# Patient Record
Sex: FEMALE | Race: OTHER | URBAN - METROPOLITAN AREA
[De-identification: names, ages, dates, MRNs, and addresses within clinical notes are randomized per-mention and may not be internally consistent; named-entity substitution may affect disease eponyms.]

---

## 2023-06-16 ENCOUNTER — TELEPHONE (OUTPATIENT)
Dept: OBGYN CLINIC | Facility: CLINIC | Age: 33
End: 2023-06-16

## 2023-06-16 NOTE — TELEPHONE ENCOUNTER
Pt confirms 5/7/23 and +hpt. 28-36 cycles. Pt agrees to PN appt rotation with our 2 male and 4 female physicians. OBN appt scheduled. Pt is taking PNV, advised to check that it has iron, folic acid and dha.

## 2023-06-29 ENCOUNTER — NURSE ONLY (OUTPATIENT)
Dept: OBGYN CLINIC | Facility: CLINIC | Age: 33
End: 2023-06-29

## 2023-06-29 DIAGNOSIS — Z34.01 ENCOUNTER FOR SUPERVISION OF NORMAL FIRST PREGNANCY IN FIRST TRIMESTER: Primary | ICD-10-CM

## 2023-06-29 RX ORDER — MELATONIN
1000 DAILY
COMMUNITY

## 2023-06-29 RX ORDER — CHOLECALCIFEROL (VITAMIN D3) 25 MCG
1 TABLET,CHEWABLE ORAL DAILY
COMMUNITY

## 2023-07-09 ENCOUNTER — LAB ENCOUNTER (OUTPATIENT)
Dept: LAB | Facility: HOSPITAL | Age: 33
End: 2023-07-09
Attending: OBSTETRICS & GYNECOLOGY
Payer: COMMERCIAL

## 2023-07-09 DIAGNOSIS — Z34.01 ENCOUNTER FOR SUPERVISION OF NORMAL FIRST PREGNANCY IN FIRST TRIMESTER: ICD-10-CM

## 2023-07-09 LAB
ANTIBODY SCREEN: NEGATIVE
BASOPHILS # BLD AUTO: 0.03 X10(3) UL (ref 0–0.2)
BASOPHILS NFR BLD AUTO: 0.3 %
DEPRECATED RDW RBC AUTO: 38.2 FL (ref 35.1–46.3)
EOSINOPHIL # BLD AUTO: 0.09 X10(3) UL (ref 0–0.7)
EOSINOPHIL NFR BLD AUTO: 1 %
ERYTHROCYTE [DISTWIDTH] IN BLOOD BY AUTOMATED COUNT: 11.9 % (ref 11–15)
HBV SURFACE AG SER-ACNC: <0.1 [IU]/L
HBV SURFACE AG SERPL QL IA: NONREACTIVE
HCG SERPL QL: POSITIVE
HCT VFR BLD AUTO: 37 %
HCV AB SERPL QL IA: NONREACTIVE
HGB BLD-MCNC: 13.1 G/DL
IMM GRANULOCYTES # BLD AUTO: 0.03 X10(3) UL (ref 0–1)
IMM GRANULOCYTES NFR BLD: 0.3 %
LYMPHOCYTES # BLD AUTO: 2.61 X10(3) UL (ref 1–4)
LYMPHOCYTES NFR BLD AUTO: 29.1 %
MCH RBC QN AUTO: 31.3 PG (ref 26–34)
MCHC RBC AUTO-ENTMCNC: 35.4 G/DL (ref 31–37)
MCV RBC AUTO: 88.5 FL
MONOCYTES # BLD AUTO: 0.46 X10(3) UL (ref 0.1–1)
MONOCYTES NFR BLD AUTO: 5.1 %
NEUTROPHILS # BLD AUTO: 5.76 X10 (3) UL (ref 1.5–7.7)
NEUTROPHILS # BLD AUTO: 5.76 X10(3) UL (ref 1.5–7.7)
NEUTROPHILS NFR BLD AUTO: 64.2 %
PLATELET # BLD AUTO: 310 10(3)UL (ref 150–450)
RBC # BLD AUTO: 4.18 X10(6)UL
RH BLOOD TYPE: POSITIVE
RUBV IGG SER QL: POSITIVE
RUBV IGG SER-ACNC: 50.6 IU/ML (ref 10–?)
WBC # BLD AUTO: 9 X10(3) UL (ref 4–11)

## 2023-07-09 PROCEDURE — 86850 RBC ANTIBODY SCREEN: CPT

## 2023-07-09 PROCEDURE — 36415 COLL VENOUS BLD VENIPUNCTURE: CPT

## 2023-07-09 PROCEDURE — 85025 COMPLETE CBC W/AUTO DIFF WBC: CPT

## 2023-07-09 PROCEDURE — 86803 HEPATITIS C AB TEST: CPT

## 2023-07-09 PROCEDURE — 84703 CHORIONIC GONADOTROPIN ASSAY: CPT

## 2023-07-09 PROCEDURE — 87389 HIV-1 AG W/HIV-1&-2 AB AG IA: CPT

## 2023-07-09 PROCEDURE — 87086 URINE CULTURE/COLONY COUNT: CPT

## 2023-07-09 PROCEDURE — 86762 RUBELLA ANTIBODY: CPT

## 2023-07-09 PROCEDURE — 87340 HEPATITIS B SURFACE AG IA: CPT

## 2023-07-09 PROCEDURE — 86901 BLOOD TYPING SEROLOGIC RH(D): CPT

## 2023-07-09 PROCEDURE — 86780 TREPONEMA PALLIDUM: CPT

## 2023-07-09 PROCEDURE — 86900 BLOOD TYPING SEROLOGIC ABO: CPT

## 2023-07-10 LAB — T PALLIDUM AB SER QL: NEGATIVE

## 2023-07-12 ENCOUNTER — INITIAL PRENATAL (OUTPATIENT)
Dept: OBGYN CLINIC | Facility: CLINIC | Age: 33
End: 2023-07-12

## 2023-07-12 VITALS — WEIGHT: 139.63 LBS | DIASTOLIC BLOOD PRESSURE: 83 MMHG | HEART RATE: 81 BPM | SYSTOLIC BLOOD PRESSURE: 123 MMHG

## 2023-07-12 DIAGNOSIS — Z34.91 ENCOUNTER FOR SUPERVISION OF NORMAL PREGNANCY IN FIRST TRIMESTER, UNSPECIFIED GRAVIDITY: Primary | ICD-10-CM

## 2023-07-12 PROBLEM — R76.0 LUPUS ANTICOAGULANT POSITIVE: Status: ACTIVE | Noted: 2023-07-12

## 2023-07-12 PROBLEM — Z86.018 HISTORY OF UTERINE FIBROID: Status: ACTIVE | Noted: 2023-07-12

## 2023-07-12 LAB
BILIRUBIN: NEGATIVE
GLUCOSE (URINE DIPSTICK): NEGATIVE MG/DL
KETONES (URINE DIPSTICK): NEGATIVE MG/DL
MULTISTIX LOT#: ABNORMAL NUMERIC
NITRITE, URINE: NEGATIVE
OCCULT BLOOD: NEGATIVE
PH, URINE: 8 (ref 4.5–8)
SPECIFIC GRAVITY: 1.01 (ref 1–1.03)
UROBILINOGEN,SEMI-QN: 0.2 MG/DL (ref 0–1.9)

## 2023-07-12 PROCEDURE — 3079F DIAST BP 80-89 MM HG: CPT | Performed by: OBSTETRICS & GYNECOLOGY

## 2023-07-12 PROCEDURE — 3074F SYST BP LT 130 MM HG: CPT | Performed by: OBSTETRICS & GYNECOLOGY

## 2023-07-12 PROCEDURE — 76815 OB US LIMITED FETUS(S): CPT | Performed by: OBSTETRICS & GYNECOLOGY

## 2023-07-12 PROCEDURE — 81002 URINALYSIS NONAUTO W/O SCOPE: CPT | Performed by: OBSTETRICS & GYNECOLOGY

## 2023-07-12 NOTE — PROGRESS NOTES
Has fam h/o SLE- she had workup with pcp and has elevated lupus anticoagulant. Was supposed to see rheumatology but has not - encouraged to see asap and we will need the consult. States was told she has 2 fibroids- she will get report.   Gc/chlam done

## 2023-07-13 LAB
C TRACH DNA SPEC QL NAA+PROBE: NEGATIVE
N GONORRHOEA DNA SPEC QL NAA+PROBE: NEGATIVE

## 2023-07-14 LAB — T VAGINALIS RRNA SPEC QL NAA+PROBE: NEGATIVE

## 2023-08-16 ENCOUNTER — ROUTINE PRENATAL (OUTPATIENT)
Dept: OBGYN CLINIC | Facility: CLINIC | Age: 33
End: 2023-08-16

## 2023-08-16 ENCOUNTER — TELEPHONE (OUTPATIENT)
Dept: RHEUMATOLOGY | Facility: CLINIC | Age: 33
End: 2023-08-16

## 2023-08-16 ENCOUNTER — TELEPHONE (OUTPATIENT)
Dept: OBGYN CLINIC | Facility: CLINIC | Age: 33
End: 2023-08-16

## 2023-08-16 VITALS
HEART RATE: 89 BPM | HEIGHT: 60 IN | WEIGHT: 137 LBS | SYSTOLIC BLOOD PRESSURE: 111 MMHG | DIASTOLIC BLOOD PRESSURE: 78 MMHG | BODY MASS INDEX: 26.9 KG/M2

## 2023-08-16 DIAGNOSIS — Z34.92 ENCOUNTER FOR SUPERVISION OF NORMAL PREGNANCY IN SECOND TRIMESTER, UNSPECIFIED GRAVIDITY: Primary | ICD-10-CM

## 2023-08-16 DIAGNOSIS — R76.0 LUPUS ANTICOAGULANT POSITIVE: Primary | ICD-10-CM

## 2023-08-16 LAB
BILIRUBIN: NEGATIVE
GLUCOSE (URINE DIPSTICK): NEGATIVE MG/DL
KETONES (URINE DIPSTICK): NEGATIVE MG/DL
LEUKOCYTES: NEGATIVE
MULTISTIX LOT#: 57 NUMERIC
NITRITE, URINE: NEGATIVE
OCCULT BLOOD: NEGATIVE
PH, URINE: 7.5 (ref 4.5–8)
PROTEIN (URINE DIPSTICK): NEGATIVE MG/DL
SPECIFIC GRAVITY: 1 (ref 1–1.03)
UROBILINOGEN,SEMI-QN: 0.2 MG/DL (ref 0–1.9)

## 2023-08-16 PROCEDURE — 3008F BODY MASS INDEX DOCD: CPT | Performed by: OBSTETRICS & GYNECOLOGY

## 2023-08-16 PROCEDURE — 3078F DIAST BP <80 MM HG: CPT | Performed by: OBSTETRICS & GYNECOLOGY

## 2023-08-16 PROCEDURE — 3074F SYST BP LT 130 MM HG: CPT | Performed by: OBSTETRICS & GYNECOLOGY

## 2023-08-16 PROCEDURE — 81002 URINALYSIS NONAUTO W/O SCOPE: CPT | Performed by: OBSTETRICS & GYNECOLOGY

## 2023-08-16 RX ORDER — METOCLOPRAMIDE 10 MG/1
10 TABLET ORAL EVERY 6 HOURS PRN
Qty: 20 TABLET | Refills: 0 | Status: SHIPPED | OUTPATIENT
Start: 2023-08-16

## 2023-08-16 NOTE — PROGRESS NOTES
Here with partner. [facility] ultrasound-- will change EDC. Made rheumatology appt in 10/2023.   RTC 4 wk

## 2023-08-16 NOTE — TELEPHONE ENCOUNTER
Call placed to Dr. Maryelizabeth Hatchet office  at [phone] to request sooner visit. PSR put in a message for Dr. Soledad Steward and staff to see if able to add sooner. Await call back. Pt notified of change in [facility]. Patient verbalized understanding.

## 2023-08-16 NOTE — TELEPHONE ENCOUNTER
Ivan Johnson's office calling to inform that patient has tested positive for lupus antibodies, and also has a family history of SLE and is pregnant.  Dr. Radha Anthony is asking if patient can be seen sooner than the October appointment, please advise

## 2023-08-16 NOTE — TELEPHONE ENCOUNTER
Pt has rheumatology appt with Dr Yas Price 10/2023. Pt with +lupus anticoagulant.   Please assist pt by calling office to move up her appt --she needs to be seen as soon as possible    Also please inform pt that Crescencio Albarado changed to 2/18/24--after reviewing her ultrasound done at [facility]

## 2023-08-16 NOTE — TELEPHONE ENCOUNTER
To HALINA: please advise if oyu want me to assist with this or would you like the Rns to assist with this?

## 2023-08-17 ENCOUNTER — TELEPHONE (OUTPATIENT)
Dept: PERINATAL CARE | Facility: HOSPITAL | Age: 33
End: 2023-08-17

## 2023-08-17 NOTE — TELEPHONE ENCOUNTER
Returned pt call RE scheduling.   No answer  Left Voice mail to call back with [facility] number  [phone]

## 2023-08-21 NOTE — TELEPHONE ENCOUNTER
Phoned patient asking if she can come in today at 2pm, patient is unable because she is working until 3:30.  Patient will remain on wait list.

## 2023-08-24 NOTE — TELEPHONE ENCOUNTER
Discussed with Toya (Rhe[facility]). Pt was offered sooner appt with Dr. Evaristo Marshall and pt declined due to work schedule. Pt scheduled for sooner appt 9/28/23 with Dr. Evaristo Marshall.

## 2023-09-14 ENCOUNTER — ROUTINE PRENATAL (OUTPATIENT)
Dept: OBGYN CLINIC | Facility: CLINIC | Age: 33
End: 2023-09-14

## 2023-09-14 VITALS
BODY MASS INDEX: 27 KG/M2 | HEART RATE: 81 BPM | WEIGHT: 136 LBS | SYSTOLIC BLOOD PRESSURE: 119 MMHG | DIASTOLIC BLOOD PRESSURE: 82 MMHG

## 2023-09-14 DIAGNOSIS — Z34.92 ENCOUNTER FOR SUPERVISION OF NORMAL PREGNANCY IN SECOND TRIMESTER, UNSPECIFIED GRAVIDITY: Primary | ICD-10-CM

## 2023-09-14 LAB
BILIRUBIN: NEGATIVE
GLUCOSE (URINE DIPSTICK): NEGATIVE MG/DL
KETONES (URINE DIPSTICK): NEGATIVE MG/DL
LEUKOCYTES: NEGATIVE
MULTISTIX LOT#: 57 NUMERIC
NITRITE, URINE: NEGATIVE
OCCULT BLOOD: NEGATIVE
PH, URINE: 7.5 (ref 4.5–8)
PROTEIN (URINE DIPSTICK): NEGATIVE MG/DL
SPECIFIC GRAVITY: 1.01 (ref 1–1.03)
UROBILINOGEN,SEMI-QN: 0.2 MG/DL (ref 0–1.9)

## 2023-09-14 PROCEDURE — 81002 URINALYSIS NONAUTO W/O SCOPE: CPT | Performed by: OBSTETRICS & GYNECOLOGY

## 2023-09-14 PROCEDURE — 3074F SYST BP LT 130 MM HG: CPT | Performed by: OBSTETRICS & GYNECOLOGY

## 2023-09-14 PROCEDURE — 3079F DIAST BP 80-89 MM HG: CPT | Performed by: OBSTETRICS & GYNECOLOGY

## 2023-09-28 ENCOUNTER — LAB ENCOUNTER (OUTPATIENT)
Dept: LAB | Age: 33
End: 2023-09-28
Attending: INTERNAL MEDICINE
Payer: COMMERCIAL

## 2023-09-28 ENCOUNTER — OFFICE VISIT (OUTPATIENT)
Dept: RHEUMATOLOGY | Facility: CLINIC | Age: 33
End: 2023-09-28

## 2023-09-28 VITALS
WEIGHT: 139 LBS | BODY MASS INDEX: 27.29 KG/M2 | DIASTOLIC BLOOD PRESSURE: 74 MMHG | HEIGHT: 60 IN | HEART RATE: 88 BPM | SYSTOLIC BLOOD PRESSURE: 122 MMHG

## 2023-09-28 DIAGNOSIS — M25.50 POLYARTHRALGIA: ICD-10-CM

## 2023-09-28 DIAGNOSIS — M25.50 POLYARTHRALGIA: Primary | ICD-10-CM

## 2023-09-28 LAB — RHEUMATOID FACT SERPL-ACNC: <10 IU/ML (ref ?–15)

## 2023-09-28 PROCEDURE — 3074F SYST BP LT 130 MM HG: CPT | Performed by: INTERNAL MEDICINE

## 2023-09-28 PROCEDURE — 85610 PROTHROMBIN TIME: CPT

## 2023-09-28 PROCEDURE — 86147 CARDIOLIPIN ANTIBODY EA IG: CPT

## 2023-09-28 PROCEDURE — 85613 RUSSELL VIPER VENOM DILUTED: CPT

## 2023-09-28 PROCEDURE — 3078F DIAST BP <80 MM HG: CPT | Performed by: INTERNAL MEDICINE

## 2023-09-28 PROCEDURE — 36415 COLL VENOUS BLD VENIPUNCTURE: CPT

## 2023-09-28 PROCEDURE — 85598 HEXAGNAL PHOSPH PLTLT NEUTRL: CPT

## 2023-09-28 PROCEDURE — 86431 RHEUMATOID FACTOR QUANT: CPT | Performed by: INTERNAL MEDICINE

## 2023-09-28 PROCEDURE — 3008F BODY MASS INDEX DOCD: CPT | Performed by: INTERNAL MEDICINE

## 2023-09-28 PROCEDURE — 99204 OFFICE O/P NEW MOD 45 MIN: CPT | Performed by: INTERNAL MEDICINE

## 2023-09-28 PROCEDURE — 85730 THROMBOPLASTIN TIME PARTIAL: CPT

## 2023-09-28 PROCEDURE — 86146 BETA-2 GLYCOPROTEIN ANTIBODY: CPT | Performed by: INTERNAL MEDICINE

## 2023-09-28 PROCEDURE — 81374 HLA I TYPING 1 ANTIGEN LR: CPT

## 2023-09-28 PROCEDURE — 85390 FIBRINOLYSINS SCREEN I&R: CPT

## 2023-09-29 LAB
APTT PPP: 35.4 SECONDS (ref 23.3–35.6)
B2 GLYCOPROT1 IGG SERPL IA-ACNC: <0.8 U/ML (ref ?–7)
B2 GLYCOPROT1 IGM SERPL IA-ACNC: <2.4 U/ML (ref ?–7)
CARDIOLIPIN IGG SERPL-ACNC: 1.2 GPL (ref ?–10)
CARDIOLIPIN IGM SERPL-ACNC: 2.5 MPL (ref ?–10)
CONFIRM APTT STACLOT: NEGATIVE
CONFIRM DRVVT: 1 S (ref 0–1.1)
PROTHROMBIN TIME: 13.2 SECONDS (ref 11.6–14.8)

## 2023-10-04 ENCOUNTER — HOSPITAL ENCOUNTER (OUTPATIENT)
Dept: PERINATAL CARE | Facility: HOSPITAL | Age: 33
Discharge: HOME | End: 2023-10-04
Attending: OBSTETRICS & GYNECOLOGY

## 2023-10-04 ENCOUNTER — HOSPITAL ENCOUNTER (OUTPATIENT)
Dept: PERINATAL CARE | Facility: HOSPITAL | Age: 33
Discharge: HOME | End: 2023-10-04
Attending: OBSTETRICS & GYNECOLOGY
Payer: COMMERCIAL

## 2023-10-04 ENCOUNTER — HOSPITAL ENCOUNTER (EMERGENCY)
Facility: HOSPITAL | Age: 33
Discharge: STILL ADMITTED OR ADMINISTRATIVE | End: 2023-10-04
Payer: COMMERCIAL

## 2023-10-04 ENCOUNTER — HOSPITAL ENCOUNTER (OUTPATIENT)
Facility: HOSPITAL | Age: 33
Discharge: ANOTHER HOSPITAL | End: 2023-10-05
Attending: OBSTETRICS & GYNECOLOGY | Admitting: OBSTETRICS & GYNECOLOGY

## 2023-10-04 VITALS — HEART RATE: 100 BPM | SYSTOLIC BLOOD PRESSURE: 139 MMHG | DIASTOLIC BLOOD PRESSURE: 83 MMHG

## 2023-10-04 VITALS
WEIGHT: 139 LBS | SYSTOLIC BLOOD PRESSURE: 123 MMHG | BODY MASS INDEX: 27 KG/M2 | HEART RATE: 103 BPM | DIASTOLIC BLOOD PRESSURE: 73 MMHG

## 2023-10-04 DIAGNOSIS — R76.0 LUPUS ANTICOAGULANT POSITIVE: ICD-10-CM

## 2023-10-04 DIAGNOSIS — Z86.018 HISTORY OF UTERINE FIBROID: ICD-10-CM

## 2023-10-04 DIAGNOSIS — Z3A.20 PREGNANCY WITH 20 COMPLETED WEEKS GESTATION: ICD-10-CM

## 2023-10-04 DIAGNOSIS — R76.0 LUPUS ANTICOAGULANT POSITIVE: Primary | ICD-10-CM

## 2023-10-04 PROCEDURE — 76811 OB US DETAILED SNGL FETUS: CPT | Performed by: OBSTETRICS & GYNECOLOGY

## 2023-10-05 ENCOUNTER — HOSPITAL ENCOUNTER (EMERGENCY)
Facility: HOSPITAL | Age: 33
Discharge: HOME | End: 2023-10-05
Attending: EMERGENCY MEDICINE

## 2023-10-05 VITALS
SYSTOLIC BLOOD PRESSURE: 113 MMHG | TEMPERATURE: 99 F | DIASTOLIC BLOOD PRESSURE: 70 MMHG | HEART RATE: 80 BPM | RESPIRATION RATE: 17 BRPM | OXYGEN SATURATION: 99 %

## 2023-10-05 DIAGNOSIS — S16.1XXA STRAIN OF NECK MUSCLE, INITIAL ENCOUNTER: ICD-10-CM

## 2023-10-05 DIAGNOSIS — S09.90XA INJURY OF HEAD, INITIAL ENCOUNTER: Primary | ICD-10-CM

## 2023-10-05 PROCEDURE — 99283 EMERGENCY DEPT VISIT LOW MDM: CPT

## 2023-10-05 PROCEDURE — 59025 FETAL NON-STRESS TEST: CPT | Performed by: OBSTETRICS & GYNECOLOGY

## 2023-10-05 PROCEDURE — 99282 EMERGENCY DEPT VISIT SF MDM: CPT
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
## 2023-10-05 NOTE — ED INITIAL ASSESSMENT (HPI)
Fall today after her dog took her out from behind. C/o pain in left posterior head, neck and back. No thinners. She is 20 weeks pregnant and went to [facility] already.

## 2023-10-05 NOTE — DISCHARGE INSTRUCTIONS
Take Tylenol as needed for pain. Apply ice to the injured areas for 20 minutes at a time over the next 2 days. Follow-up with your primary physician and your OB as discussed. Return to the emergency department if severe headache, repeated vomiting, vision changes, focal weakness, discoordination, or other new symptoms develop.

## 2023-10-05 NOTE — ED QUICK NOTES
Discharge instructions including follow-up care and signs and symptoms to return to ED were reviewed and discussed with patient. Pt verbalized understanding to all information and all questions asked were answered at this time. Pt is AAOx4, calm, respirations noted as even and unlabored, skin warm and dry, and there are no signs or symptoms of distress noted at this time. Pt ambulatory with a steady gait to exit with family.

## 2023-10-05 NOTE — ED QUICK NOTES
Pt to ed w/c of fall at around 2200 yesterday. The pt states she was walking her dog when it took her down from behind. The pt states she fell head first on grass. Pt is 20 weeks pregnant. Reports headache, neck and tailbone pain. Pt in no obvious distress.

## 2023-10-09 LAB — HLA-B27: NEGATIVE

## 2023-10-12 ENCOUNTER — ROUTINE PRENATAL (OUTPATIENT)
Dept: OBGYN CLINIC | Facility: CLINIC | Age: 33
End: 2023-10-12

## 2023-10-12 VITALS
WEIGHT: 140.19 LBS | SYSTOLIC BLOOD PRESSURE: 112 MMHG | DIASTOLIC BLOOD PRESSURE: 72 MMHG | HEART RATE: 84 BPM | BODY MASS INDEX: 27 KG/M2

## 2023-10-12 DIAGNOSIS — Z34.92 ENCOUNTER FOR SUPERVISION OF NORMAL PREGNANCY IN SECOND TRIMESTER, UNSPECIFIED GRAVIDITY: Primary | ICD-10-CM

## 2023-10-12 LAB
APPEARANCE: CLEAR
BILIRUBIN: NEGATIVE
GLUCOSE (URINE DIPSTICK): NEGATIVE MG/DL
KETONES (URINE DIPSTICK): NEGATIVE MG/DL
LEUKOCYTES: NEGATIVE
MULTISTIX LOT#: NORMAL NUMERIC
NITRITE, URINE: NEGATIVE
OCCULT BLOOD: NEGATIVE
PH, URINE: 7.5 (ref 4.5–8)
SPECIFIC GRAVITY: 1 (ref 1–1.03)
URINE-COLOR: YELLOW
UROBILINOGEN,SEMI-QN: 0.2 MG/DL (ref 0–1.9)

## 2023-10-12 PROCEDURE — 3078F DIAST BP <80 MM HG: CPT | Performed by: OBSTETRICS & GYNECOLOGY

## 2023-10-12 PROCEDURE — 3074F SYST BP LT 130 MM HG: CPT | Performed by: OBSTETRICS & GYNECOLOGY

## 2023-10-12 PROCEDURE — 81002 URINALYSIS NONAUTO W/O SCOPE: CPT | Performed by: OBSTETRICS & GYNECOLOGY

## 2023-10-18 ENCOUNTER — TELEPHONE (OUTPATIENT)
Dept: OBGYN CLINIC | Facility: CLINIC | Age: 33
End: 2023-10-18

## 2023-10-18 NOTE — TELEPHONE ENCOUNTER
Order received from [facility] for breast pump. Form completed and faxed with confirmation. Sent to scanning.

## 2023-10-27 ENCOUNTER — PATIENT MESSAGE (OUTPATIENT)
Dept: OBGYN CLINIC | Facility: CLINIC | Age: 33
End: 2023-10-27

## 2023-10-27 NOTE — TELEPHONE ENCOUNTER
From: Baudilio Lincoln  To: Estevan Mai  Sent: 10/27/2023 1:11 PM CDT  Subject: Cramping/pressure    Hello, I am 23 weeks pregnant and since last night I started feeling light cramping/pressure underneath my stomach in my pelvic area. I just wanted to know if that is normal or is that something to be concerned about?

## 2023-10-27 NOTE — TELEPHONE ENCOUNTER
From: Jaime Goodman  To: Addison Aly  Sent: 10/27/2023 11:25 AM CDT  Subject: Cramping/pressure    Hello, I am 23 weeks pregnant and since last night I started feeling light cramping/pressure underneath my stomach in my pelvic area. I just wanted to know if that is normal or is that something to be concerned about?

## 2023-10-29 ENCOUNTER — LAB ENCOUNTER (OUTPATIENT)
Dept: LAB | Facility: HOSPITAL | Age: 33
End: 2023-10-29
Attending: OBSTETRICS & GYNECOLOGY
Payer: COMMERCIAL

## 2023-10-29 DIAGNOSIS — Z34.92 ENCOUNTER FOR SUPERVISION OF NORMAL PREGNANCY IN SECOND TRIMESTER, UNSPECIFIED GRAVIDITY: ICD-10-CM

## 2023-10-29 LAB
BILIRUB UR QL: NEGATIVE
CLARITY UR: CLEAR
COLOR UR: YELLOW
GLUCOSE UR-MCNC: NORMAL MG/DL
HGB UR QL STRIP.AUTO: NEGATIVE
KETONES UR-MCNC: NEGATIVE MG/DL
LEUKOCYTE ESTERASE UR QL STRIP.AUTO: NEGATIVE
NITRITE UR QL STRIP.AUTO: NEGATIVE
PH UR: 6.5 [PH] (ref 5–8)
PROT UR-MCNC: 30 MG/DL
SP GR UR STRIP: 1.02 (ref 1–1.03)
UROBILINOGEN UR STRIP-ACNC: NORMAL

## 2023-10-29 PROCEDURE — 81001 URINALYSIS AUTO W/SCOPE: CPT

## 2023-11-09 ENCOUNTER — ROUTINE PRENATAL (OUTPATIENT)
Dept: OBGYN CLINIC | Facility: CLINIC | Age: 33
End: 2023-11-09
Payer: COMMERCIAL

## 2023-11-09 VITALS
BODY MASS INDEX: 27 KG/M2 | WEIGHT: 140 LBS | HEART RATE: 83 BPM | DIASTOLIC BLOOD PRESSURE: 72 MMHG | SYSTOLIC BLOOD PRESSURE: 112 MMHG

## 2023-11-09 DIAGNOSIS — Z34.92 ENCOUNTER FOR SUPERVISION OF NORMAL PREGNANCY IN SECOND TRIMESTER, UNSPECIFIED GRAVIDITY: Primary | ICD-10-CM

## 2023-11-09 LAB
BILIRUBIN: NEGATIVE
GLUCOSE (URINE DIPSTICK): NEGATIVE MG/DL
KETONES (URINE DIPSTICK): NEGATIVE MG/DL
LEUKOCYTES: NEGATIVE
MULTISTIX LOT#: 57 NUMERIC
NITRITE, URINE: NEGATIVE
OCCULT BLOOD: NEGATIVE
PH, URINE: 7.5 (ref 4.5–8)
SPECIFIC GRAVITY: 1.01 (ref 1–1.03)
UROBILINOGEN,SEMI-QN: 0.2 MG/DL (ref 0–1.9)

## 2023-11-09 PROCEDURE — 3078F DIAST BP <80 MM HG: CPT | Performed by: OBSTETRICS & GYNECOLOGY

## 2023-11-09 PROCEDURE — 3074F SYST BP LT 130 MM HG: CPT | Performed by: OBSTETRICS & GYNECOLOGY

## 2023-11-09 PROCEDURE — 81002 URINALYSIS NONAUTO W/O SCOPE: CPT | Performed by: OBSTETRICS & GYNECOLOGY

## 2023-11-10 ENCOUNTER — PATIENT MESSAGE (OUTPATIENT)
Dept: RHEUMATOLOGY | Facility: CLINIC | Age: 33
End: 2023-11-10

## 2023-11-10 DIAGNOSIS — M25.50 POLYARTHRALGIA: Primary | ICD-10-CM

## 2023-11-30 ENCOUNTER — ROUTINE PRENATAL (OUTPATIENT)
Dept: OBGYN CLINIC | Facility: CLINIC | Age: 33
End: 2023-11-30
Payer: MEDICAID

## 2023-11-30 VITALS — BODY MASS INDEX: 28 KG/M2 | DIASTOLIC BLOOD PRESSURE: 77 MMHG | SYSTOLIC BLOOD PRESSURE: 117 MMHG | WEIGHT: 142 LBS

## 2023-11-30 DIAGNOSIS — Z34.83 ENCOUNTER FOR SUPERVISION OF OTHER NORMAL PREGNANCY IN THIRD TRIMESTER: Primary | ICD-10-CM

## 2023-11-30 LAB
APPEARANCE: CLEAR
BILIRUBIN: NEGATIVE
GLUCOSE (URINE DIPSTICK): NEGATIVE MG/DL
KETONES (URINE DIPSTICK): NEGATIVE MG/DL
MULTISTIX LOT#: ABNORMAL NUMERIC
NITRITE, URINE: NEGATIVE
OCCULT BLOOD: NEGATIVE
PH, URINE: 7 (ref 4.5–8)
PROTEIN (URINE DIPSTICK): NEGATIVE MG/DL
SPECIFIC GRAVITY: 1.01 (ref 1–1.03)
URINE-COLOR: YELLOW
UROBILINOGEN,SEMI-QN: 0.2 MG/DL (ref 0–1.9)

## 2023-11-30 PROCEDURE — 3078F DIAST BP <80 MM HG: CPT | Performed by: OBSTETRICS & GYNECOLOGY

## 2023-11-30 PROCEDURE — 81002 URINALYSIS NONAUTO W/O SCOPE: CPT | Performed by: OBSTETRICS & GYNECOLOGY

## 2023-11-30 PROCEDURE — 99213 OFFICE O/P EST LOW 20 MIN: CPT | Performed by: OBSTETRICS & GYNECOLOGY

## 2023-11-30 PROCEDURE — 3074F SYST BP LT 130 MM HG: CPT | Performed by: OBSTETRICS & GYNECOLOGY

## 2023-12-01 NOTE — PROGRESS NOTES
Lesly Marroquin at visit. No S/S of PTL. Discussed TDAP for her and family. She wishes to do next visit.

## 2023-12-07 ENCOUNTER — LAB ENCOUNTER (OUTPATIENT)
Dept: LAB | Facility: HOSPITAL | Age: 33
End: 2023-12-07
Attending: OBSTETRICS & GYNECOLOGY
Payer: COMMERCIAL

## 2023-12-07 LAB
DEPRECATED RDW RBC AUTO: 41.3 FL (ref 35.1–46.3)
ERYTHROCYTE [DISTWIDTH] IN BLOOD BY AUTOMATED COUNT: 12.7 % (ref 11–15)
GLUCOSE 1H P GLC SERPL-MCNC: 108 MG/DL
HCT VFR BLD AUTO: 31.2 %
HGB BLD-MCNC: 10.8 G/DL
MCH RBC QN AUTO: 31 PG (ref 26–34)
MCHC RBC AUTO-ENTMCNC: 34.6 G/DL (ref 31–37)
MCV RBC AUTO: 89.7 FL
PLATELET # BLD AUTO: 277 10(3)UL (ref 150–450)
RBC # BLD AUTO: 3.48 X10(6)UL
WBC # BLD AUTO: 7.8 X10(3) UL (ref 4–11)

## 2023-12-07 PROCEDURE — 85027 COMPLETE CBC AUTOMATED: CPT | Performed by: OBSTETRICS & GYNECOLOGY

## 2023-12-07 PROCEDURE — 36415 COLL VENOUS BLD VENIPUNCTURE: CPT | Performed by: OBSTETRICS & GYNECOLOGY

## 2023-12-07 PROCEDURE — 82950 GLUCOSE TEST: CPT | Performed by: OBSTETRICS & GYNECOLOGY

## 2023-12-12 PROBLEM — O99.019 ANEMIA AFFECTING PREGNANCY (HCC): Status: ACTIVE | Noted: 2023-12-12

## 2023-12-12 PROBLEM — O99.019 ANEMIA AFFECTING PREGNANCY: Status: ACTIVE | Noted: 2023-12-12

## 2023-12-14 ENCOUNTER — ROUTINE PRENATAL (OUTPATIENT)
Dept: OBGYN CLINIC | Facility: CLINIC | Age: 33
End: 2023-12-14
Payer: COMMERCIAL

## 2023-12-14 ENCOUNTER — LAB ENCOUNTER (OUTPATIENT)
Dept: LAB | Facility: HOSPITAL | Age: 33
End: 2023-12-14
Attending: OBSTETRICS & GYNECOLOGY
Payer: COMMERCIAL

## 2023-12-14 ENCOUNTER — TELEPHONE (OUTPATIENT)
Dept: OBGYN CLINIC | Facility: CLINIC | Age: 33
End: 2023-12-14

## 2023-12-14 VITALS
DIASTOLIC BLOOD PRESSURE: 69 MMHG | SYSTOLIC BLOOD PRESSURE: 111 MMHG | BODY MASS INDEX: 28 KG/M2 | WEIGHT: 145.19 LBS | HEART RATE: 88 BPM

## 2023-12-14 DIAGNOSIS — Z34.93 ENCOUNTER FOR SUPERVISION OF NORMAL PREGNANCY IN THIRD TRIMESTER, UNSPECIFIED GRAVIDITY: Primary | ICD-10-CM

## 2023-12-14 DIAGNOSIS — L29.9 ITCHING: ICD-10-CM

## 2023-12-14 LAB
ALBUMIN SERPL-MCNC: 4 G/DL (ref 3.2–4.8)
ALBUMIN/GLOB SERPL: 1.3 {RATIO} (ref 1–2)
ALP LIVER SERPL-CCNC: 111 U/L
ALT SERPL-CCNC: 9 U/L
ANION GAP SERPL CALC-SCNC: 7 MMOL/L (ref 0–18)
APPEARANCE: CLEAR
AST SERPL-CCNC: 18 U/L (ref ?–34)
BILIRUB SERPL-MCNC: 0.2 MG/DL (ref 0.3–1.2)
BILIRUBIN: NEGATIVE
BUN BLD-MCNC: <5 MG/DL (ref 9–23)
CALCIUM BLD-MCNC: 9.2 MG/DL (ref 8.7–10.4)
CHLORIDE SERPL-SCNC: 106 MMOL/L (ref 98–112)
CO2 SERPL-SCNC: 24 MMOL/L (ref 21–32)
CREAT BLD-MCNC: 0.47 MG/DL
EGFRCR SERPLBLD CKD-EPI 2021: 130 ML/MIN/1.73M2 (ref 60–?)
FASTING STATUS PATIENT QL REPORTED: YES
GLOBULIN PLAS-MCNC: 3 G/DL (ref 2.8–4.4)
GLUCOSE (URINE DIPSTICK): NEGATIVE MG/DL
GLUCOSE BLD-MCNC: 83 MG/DL (ref 70–99)
KETONES (URINE DIPSTICK): NEGATIVE MG/DL
LEUKOCYTES: NEGATIVE
MULTISTIX LOT#: NORMAL NUMERIC
NITRITE, URINE: NEGATIVE
OCCULT BLOOD: NEGATIVE
PH, URINE: 7.5 (ref 4.5–8)
POTASSIUM SERPL-SCNC: 3.7 MMOL/L (ref 3.5–5.1)
PROT SERPL-MCNC: 7 G/DL (ref 5.7–8.2)
PROTEIN (URINE DIPSTICK): NEGATIVE MG/DL
SODIUM SERPL-SCNC: 137 MMOL/L (ref 136–145)
SPECIFIC GRAVITY: 1.01 (ref 1–1.03)
URINE-COLOR: YELLOW
UROBILINOGEN,SEMI-QN: 0.2 MG/DL (ref 0–1.9)

## 2023-12-14 PROCEDURE — 81002 URINALYSIS NONAUTO W/O SCOPE: CPT | Performed by: OBSTETRICS & GYNECOLOGY

## 2023-12-14 PROCEDURE — 3078F DIAST BP <80 MM HG: CPT | Performed by: OBSTETRICS & GYNECOLOGY

## 2023-12-14 PROCEDURE — 3074F SYST BP LT 130 MM HG: CPT | Performed by: OBSTETRICS & GYNECOLOGY

## 2023-12-14 PROCEDURE — 82239 BILE ACIDS TOTAL: CPT

## 2023-12-14 PROCEDURE — 80053 COMPREHEN METABOLIC PANEL: CPT

## 2023-12-14 PROCEDURE — 99213 OFFICE O/P EST LOW 20 MIN: CPT | Performed by: OBSTETRICS & GYNECOLOGY

## 2023-12-14 PROCEDURE — 36415 COLL VENOUS BLD VENIPUNCTURE: CPT

## 2023-12-14 PROCEDURE — 90471 IMMUNIZATION ADMIN: CPT | Performed by: OBSTETRICS & GYNECOLOGY

## 2023-12-14 PROCEDURE — 90715 TDAP VACCINE 7 YRS/> IM: CPT | Performed by: OBSTETRICS & GYNECOLOGY

## 2023-12-14 NOTE — TELEPHONE ENCOUNTER
Received FMLA forms from patient for spouse's paternity leave. LAI was signed and NO payment was collected. Sent to the forms department for completion.

## 2023-12-14 NOTE — PROGRESS NOTES
Has starting having itching of the hands and feet for the past 2 nights; bile acids ordered. Will start daily iron. Tdap today. Has 32 wk growth scheduled. Undecided on flu vaccine.  RTC 2 wks

## 2023-12-16 LAB — BILE ACIDS: <1 UMOL/L

## 2023-12-19 ENCOUNTER — PATIENT MESSAGE (OUTPATIENT)
Dept: OBGYN CLINIC | Facility: CLINIC | Age: 33
End: 2023-12-19

## 2023-12-20 NOTE — TELEPHONE ENCOUNTER
From: Sajan Valle  To: Adama Ellis  Sent: 12/19/2023 10:25 PM CST  Subject: Bile acids/comp metabolic panel results     Hello, I wanted to see if you were able to review my test results that were done on the 14th? Thank you in advance.

## 2023-12-28 ENCOUNTER — HOSPITAL ENCOUNTER (OUTPATIENT)
Dept: PERINATAL CARE | Facility: HOSPITAL | Age: 33
Discharge: HOME | End: 2023-12-28
Attending: OBSTETRICS & GYNECOLOGY
Payer: COMMERCIAL

## 2023-12-28 ENCOUNTER — ROUTINE PRENATAL (OUTPATIENT)
Dept: OBGYN CLINIC | Facility: CLINIC | Age: 33
End: 2023-12-28

## 2023-12-28 VITALS
HEART RATE: 84 BPM | BODY MASS INDEX: 28 KG/M2 | WEIGHT: 145 LBS | DIASTOLIC BLOOD PRESSURE: 74 MMHG | SYSTOLIC BLOOD PRESSURE: 111 MMHG

## 2023-12-28 VITALS
WEIGHT: 144.63 LBS | HEART RATE: 86 BPM | BODY MASS INDEX: 28 KG/M2 | SYSTOLIC BLOOD PRESSURE: 124 MMHG | DIASTOLIC BLOOD PRESSURE: 86 MMHG

## 2023-12-28 DIAGNOSIS — O99.013 ANEMIA AFFECTING PREGNANCY IN THIRD TRIMESTER: ICD-10-CM

## 2023-12-28 DIAGNOSIS — Z34.93 ENCOUNTER FOR SUPERVISION OF NORMAL PREGNANCY IN THIRD TRIMESTER, UNSPECIFIED GRAVIDITY: Primary | ICD-10-CM

## 2023-12-28 DIAGNOSIS — R76.0 LUPUS ANTICOAGULANT POSITIVE: ICD-10-CM

## 2023-12-28 DIAGNOSIS — R76.0 LUPUS ANTICOAGULANT POSITIVE: Primary | ICD-10-CM

## 2023-12-28 LAB
APPEARANCE: CLEAR
BILIRUBIN: NEGATIVE
GLUCOSE (URINE DIPSTICK): NEGATIVE MG/DL
KETONES (URINE DIPSTICK): NEGATIVE MG/DL
LEUKOCYTES: NEGATIVE
MULTISTIX LOT#: NORMAL NUMERIC
NITRITE, URINE: NEGATIVE
OCCULT BLOOD: NEGATIVE
PH, URINE: 7 (ref 4.5–8)
PROTEIN (URINE DIPSTICK): NEGATIVE MG/DL
SPECIFIC GRAVITY: 1.01 (ref 1–1.03)
URINE-COLOR: YELLOW
UROBILINOGEN,SEMI-QN: 0.2 MG/DL (ref 0–1.9)

## 2023-12-28 PROCEDURE — 76819 FETAL BIOPHYS PROFIL W/O NST: CPT

## 2023-12-28 PROCEDURE — 3074F SYST BP LT 130 MM HG: CPT | Performed by: OBSTETRICS & GYNECOLOGY

## 2023-12-28 PROCEDURE — 3079F DIAST BP 80-89 MM HG: CPT | Performed by: OBSTETRICS & GYNECOLOGY

## 2023-12-28 PROCEDURE — 76816 OB US FOLLOW-UP PER FETUS: CPT | Performed by: OBSTETRICS & GYNECOLOGY

## 2023-12-28 PROCEDURE — 81002 URINALYSIS NONAUTO W/O SCOPE: CPT | Performed by: OBSTETRICS & GYNECOLOGY

## 2023-12-28 RX ORDER — ASPIRIN 81 MG/1
81 TABLET ORAL DAILY
COMMUNITY

## 2023-12-28 NOTE — PROGRESS NOTES
Here with partner. Discussed RSV vacc. Has MFM ultrasound today. No more itching on hands.   Bile salts normal.  RTC 2 wk

## 2024-01-09 ENCOUNTER — TELEPHONE (OUTPATIENT)
Dept: OBGYN CLINIC | Facility: CLINIC | Age: 34
End: 2024-01-09

## 2024-01-09 NOTE — TELEPHONE ENCOUNTER
Patient's  called on status of FMLA forms.  I told him that we are waiting for the doctor's signature. Pt would like to pickup forms at the providers office once the forms are completed. Patient expressed understanding.

## 2024-01-09 NOTE — TELEPHONE ENCOUNTER
Dr. Medina,     *The ACKNOWLEDGE button has been moved to the top right ribbon*    Please sign off on form if you agree to: Spouse FMLA   (place your signature on the first page only)    -From your Inbasket, Highlight the patient and click Chart   -Double click the 12/14/2023 Forms Completion telephone encounter  -Scroll down to the Media section   -Click the blue Hyperlink: MISSAEL SP Dr. Smitha Medina 01/09/2024  -Click Acknowledge located in the top right ribbon/menu   -Drag the mouse into the blank space of the document and a + sign will appear. Left click to   electronically sign the document.     Thank you,  Rosalva NOGUEIRA

## 2024-01-09 NOTE — TELEPHONE ENCOUNTER
Patient's  called on status of FMLA forms.  I told him that we are waiting for the doctor's signature. Pt would like to pickup forms at the providers office once the forms are completed. Patient expressed understanding

## 2024-01-15 ENCOUNTER — HOSPITAL ENCOUNTER (OUTPATIENT)
Dept: ULTRASOUND IMAGING | Facility: HOSPITAL | Age: 34
Discharge: HOME | End: 2024-01-15
Attending: INTERNAL MEDICINE
Payer: COMMERCIAL

## 2024-01-15 DIAGNOSIS — M25.50 POLYARTHRALGIA: ICD-10-CM

## 2024-01-15 PROCEDURE — 76881 US COMPL JOINT R-T W/IMG: CPT | Performed by: INTERNAL MEDICINE

## 2024-01-15 NOTE — TELEPHONE ENCOUNTER
Cld pt adv that forms were completed, pt adv that she has appt w/ MD tomorrow and will , gave instructions as far as letting the nurse know forms are located in Media Section of chart!    Archived forms

## 2024-01-16 ENCOUNTER — LAB ENCOUNTER (OUTPATIENT)
Dept: LAB | Facility: HOSPITAL | Age: 34
End: 2024-01-16
Attending: OBSTETRICS & GYNECOLOGY
Payer: COMMERCIAL

## 2024-01-16 ENCOUNTER — ROUTINE PRENATAL (OUTPATIENT)
Dept: OBGYN CLINIC | Facility: CLINIC | Age: 34
End: 2024-01-16
Payer: COMMERCIAL

## 2024-01-16 VITALS
DIASTOLIC BLOOD PRESSURE: 76 MMHG | HEART RATE: 93 BPM | WEIGHT: 148.38 LBS | SYSTOLIC BLOOD PRESSURE: 117 MMHG | BODY MASS INDEX: 29 KG/M2

## 2024-01-16 DIAGNOSIS — O99.013 ANEMIA AFFECTING PREGNANCY IN THIRD TRIMESTER: ICD-10-CM

## 2024-01-16 DIAGNOSIS — Z34.91 ENCOUNTER FOR SUPERVISION OF NORMAL PREGNANCY IN FIRST TRIMESTER, UNSPECIFIED GRAVIDITY: Primary | ICD-10-CM

## 2024-01-16 LAB
APPEARANCE: CLEAR
BILIRUBIN: NEGATIVE
DEPRECATED HBV CORE AB SER IA-ACNC: 22 NG/ML
DEPRECATED RDW RBC AUTO: 41.6 FL (ref 35.1–46.3)
ERYTHROCYTE [DISTWIDTH] IN BLOOD BY AUTOMATED COUNT: 12.8 % (ref 11–15)
GLUCOSE (URINE DIPSTICK): NEGATIVE MG/DL
HCT VFR BLD AUTO: 34.5 %
HGB BLD-MCNC: 12 G/DL
KETONES (URINE DIPSTICK): NEGATIVE MG/DL
LEUKOCYTES: NEGATIVE
MCH RBC QN AUTO: 31.4 PG (ref 26–34)
MCHC RBC AUTO-ENTMCNC: 34.8 G/DL (ref 31–37)
MCV RBC AUTO: 90.3 FL
MULTISTIX LOT#: NORMAL NUMERIC
NITRITE, URINE: NEGATIVE
OCCULT BLOOD: NEGATIVE
PH, URINE: 7 (ref 4.5–8)
PLATELET # BLD AUTO: 251 10(3)UL (ref 150–450)
PROTEIN (URINE DIPSTICK): NEGATIVE MG/DL
RBC # BLD AUTO: 3.82 X10(6)UL
SPECIFIC GRAVITY: 1.01 (ref 1–1.03)
URINE-COLOR: YELLOW
UROBILINOGEN,SEMI-QN: 0.2 MG/DL (ref 0–1.9)
WBC # BLD AUTO: 7.7 X10(3) UL (ref 4–11)

## 2024-01-16 PROCEDURE — 85027 COMPLETE CBC AUTOMATED: CPT

## 2024-01-16 PROCEDURE — 36415 COLL VENOUS BLD VENIPUNCTURE: CPT

## 2024-01-16 PROCEDURE — 3078F DIAST BP <80 MM HG: CPT | Performed by: OBSTETRICS & GYNECOLOGY

## 2024-01-16 PROCEDURE — 3074F SYST BP LT 130 MM HG: CPT | Performed by: OBSTETRICS & GYNECOLOGY

## 2024-01-16 PROCEDURE — 82728 ASSAY OF FERRITIN: CPT

## 2024-01-16 PROCEDURE — 81002 URINALYSIS NONAUTO W/O SCOPE: CPT | Performed by: OBSTETRICS & GYNECOLOGY

## 2024-01-16 NOTE — TELEPHONE ENCOUNTER
HIPAA release form received from patient , release form emailed and original sent to forms dept.

## 2024-01-22 ENCOUNTER — TELEPHONE (OUTPATIENT)
Dept: RHEUMATOLOGY | Facility: CLINIC | Age: 34
End: 2024-01-22

## 2024-01-22 NOTE — TELEPHONE ENCOUNTER
If patient can be contacted to make a follow-up appointment for findings of inflammation on her ultrasound of the hands.  If she is agreeable she can be added on February 13 at 10:40 AM

## 2024-01-22 NOTE — TELEPHONE ENCOUNTER
Disab received in Forms, logged for processing. 2-2. [employer] msg sent requesting LAI on 1/15/23

## 2024-01-24 ENCOUNTER — TELEPHONE (OUTPATIENT)
Dept: OBGYN CLINIC | Facility: CLINIC | Age: 34
End: 2024-01-24

## 2024-01-24 ENCOUNTER — ROUTINE PRENATAL (OUTPATIENT)
Dept: OBGYN CLINIC | Facility: CLINIC | Age: 34
End: 2024-01-24

## 2024-01-24 ENCOUNTER — LAB ENCOUNTER (OUTPATIENT)
Dept: LAB | Age: 34
End: 2024-01-24
Attending: OBSTETRICS & GYNECOLOGY
Payer: COMMERCIAL

## 2024-01-24 VITALS
HEART RATE: 78 BPM | SYSTOLIC BLOOD PRESSURE: 113 MMHG | DIASTOLIC BLOOD PRESSURE: 75 MMHG | WEIGHT: 148 LBS | BODY MASS INDEX: 29 KG/M2

## 2024-01-24 DIAGNOSIS — Z34.03 ENCOUNTER FOR SUPERVISION OF NORMAL FIRST PREGNANCY IN THIRD TRIMESTER: Primary | ICD-10-CM

## 2024-01-24 DIAGNOSIS — Z34.90 PREGNANCY, UNSPECIFIED GESTATIONAL AGE: ICD-10-CM

## 2024-01-24 DIAGNOSIS — M54.30 SCIATIC NERVE PAIN, UNSPECIFIED LATERALITY: Primary | ICD-10-CM

## 2024-01-24 DIAGNOSIS — Z34.03 ENCOUNTER FOR SUPERVISION OF NORMAL FIRST PREGNANCY IN THIRD TRIMESTER: ICD-10-CM

## 2024-01-24 LAB
APPEARANCE: CLEAR
DEPRECATED RDW RBC AUTO: 41.5 FL (ref 35.1–46.3)
ERYTHROCYTE [DISTWIDTH] IN BLOOD BY AUTOMATED COUNT: 12.7 % (ref 11–15)
GLUCOSE (URINE DIPSTICK): NEGATIVE MG/DL
HCT VFR BLD AUTO: 35.2 %
HGB BLD-MCNC: 12.2 G/DL
MCH RBC QN AUTO: 31.6 PG (ref 26–34)
MCHC RBC AUTO-ENTMCNC: 34.7 G/DL (ref 31–37)
MCV RBC AUTO: 91.2 FL
MULTISTIX LOT#: NORMAL NUMERIC
NITRITE, URINE: NEGATIVE
PLATELET # BLD AUTO: 251 10(3)UL (ref 150–450)
RBC # BLD AUTO: 3.86 X10(6)UL
T PALLIDUM AB SER QL IA: NONREACTIVE
URINE-COLOR: YELLOW
WBC # BLD AUTO: 7 X10(3) UL (ref 4–11)

## 2024-01-24 PROCEDURE — 36415 COLL VENOUS BLD VENIPUNCTURE: CPT

## 2024-01-24 PROCEDURE — 86780 TREPONEMA PALLIDUM: CPT

## 2024-01-24 PROCEDURE — 85027 COMPLETE CBC AUTOMATED: CPT

## 2024-01-24 PROCEDURE — 3078F DIAST BP <80 MM HG: CPT | Performed by: OBSTETRICS & GYNECOLOGY

## 2024-01-24 PROCEDURE — 3074F SYST BP LT 130 MM HG: CPT | Performed by: OBSTETRICS & GYNECOLOGY

## 2024-01-24 PROCEDURE — 87389 HIV-1 AG W/HIV-1&-2 AB AG IA: CPT

## 2024-01-24 PROCEDURE — 81002 URINALYSIS NONAUTO W/O SCOPE: CPT | Performed by: OBSTETRICS & GYNECOLOGY

## 2024-01-24 RX ORDER — NYSTATIN AND TRIAMCINOLONE ACETONIDE 100000; 1 [USP'U]/G; MG/G
1 OINTMENT TOPICAL 2 TIMES DAILY
Qty: 15 G | Refills: 0 | Status: SHIPPED | OUTPATIENT
Start: 2024-01-24

## 2024-01-24 NOTE — PROGRESS NOTES
C/o rash and odor in right genitocrural fold- on exam c/w skin fungal infection- mycolog rx sent, GBS cxs done, pcp thinks that she has RA but not lupus, c/o sciatic pain - severe- wants to be referred to PT

## 2024-01-24 NOTE — TELEPHONE ENCOUNTER
Dr. Sahu,      *The ACKNOWLEDGE button has been moved to the top right ribbon*    Please sign off on form if you agree to: 2 forms to sign off FMLA and disab for maternity leave start: on or near JOHNNY 24----End: 6 wks vaginal 8 wks    (place your signature on the first page only)    -From your Inbasket, Highlight the patient and click Chart   -Double click the 23 Forms Completion telephone encounter  -Scroll down to the Media section   -Click the blue Hyperlink: FMLA  Page 24, Disab  Page 24   -Click Acknowledge located in the top right ribbon/menu   -Drag the mouse into the blank space of the document and a + sign will appear. Left click to   electronically sign the document.     Thank you,    Jennifer SOTELO

## 2024-01-25 ENCOUNTER — TELEPHONE (OUTPATIENT)
Dept: PHYSICAL THERAPY | Facility: HOSPITAL | Age: 34
End: 2024-01-25

## 2024-01-25 LAB — GROUP B STREP BY PCR FOR PCR OVT: NEGATIVE

## 2024-01-26 NOTE — TELEPHONE ENCOUNTER
Forms completed and uploaded to pt's [employer]. Unable to fax forms as pt has not signed LAI. Please don't fax forms until auth has been rcv'd.

## 2024-01-29 ENCOUNTER — OFFICE VISIT (OUTPATIENT)
Dept: PHYSICAL THERAPY | Age: 34
End: 2024-01-29
Attending: OBSTETRICS & GYNECOLOGY
Payer: COMMERCIAL

## 2024-01-29 ENCOUNTER — TELEPHONE (OUTPATIENT)
Dept: PHYSICAL THERAPY | Facility: HOSPITAL | Age: 34
End: 2024-01-29

## 2024-01-29 DIAGNOSIS — Z34.90 PREGNANCY, UNSPECIFIED GESTATIONAL AGE: ICD-10-CM

## 2024-01-29 DIAGNOSIS — M54.30 SCIATIC NERVE PAIN, UNSPECIFIED LATERALITY: Primary | ICD-10-CM

## 2024-01-29 PROCEDURE — 97161 PT EVAL LOW COMPLEX 20 MIN: CPT

## 2024-01-29 PROCEDURE — 97530 THERAPEUTIC ACTIVITIES: CPT

## 2024-01-29 NOTE — PROGRESS NOTES
MUSCULOSKELETAL AND PELVIC FLOOR EVALUATION:     Diagnosis:     Sciatic nerve pain, unspecified laterality (M54.30)  Pregnancy, unspecified gestational age (Z34.90)    Referring Provider: Page  Date of Evaluation:    1/29/2024    Precautions:   37 weeks pregnant Next MD visit:   1/30/2024  Date of Surgery: n/a     PATIENT SUMMARY   Rossy Johnson is a 33 year old female  who presents to therapy today with complaints of pain across low back and shooting pains down both legs.  Symptoms started around 19 weeks of pregnancy intermittently.  She reports that at 20 weeks, her dog caused her to fall hard on her back.  Symptoms started more constant at start of 3rd trimester and has been worsening.  Pain down the legs goes down back of thighs but not below knee.  She denies any N/T into the Les.    Functional deficits:  Prolonged sitting, driving, lying down, sleeping, rolling in bed, lying to standing transfers.      Current symptoms include: back pain and bilat LE pain    Pt describes pain level: current 0/10, best 0/10, worst 7/10.   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 Pt goals include relieve some of her pain.  Past medical history was reviewed with Rossy. Significant findings include Possible RA-getting results 2/13/24    URINARY HABITS  Types of symptoms: none    BOWEL HABITS  Types of symptoms: none      ASSESSMENT  Rossy presents to physical therapy evaluation with primary c/o low back and radicular symptoms. The results of the objective tests and measures show decreased lumbar AROM and LE pain with lumbar flexion, decreased hip strength, decreased right piriformis flexibility and muscular adhesions of bilat hip musculature.  Functional deficits include but are not limited to sitting,  driving, sleeping, side lying to sit transfers.  Signs and symptoms are consistent with diagnosis of LBP with . Pt and PT discussed evaluation findings, pathology, POC and HEP.  Pt voiced understanding and performs HEP correctly without reported pain. Skilled Pelvic Physical Therapy is medically necessary to address the above impairments and reach functional goals.    OBJECTIVE:   Pelvic Alignment: neutral in standing  Palpation: Tender at bilat glut med and piriformis, tender at right PSIS.  No tenderness along lumbar spine  Diastasis Recti: no separation above or below umbilicus    Range Of Motion  Lumbar AROM screen:   Flex mild decr with pain down the right LE  Ext WNL   SB Bilat WNL  Rot mod decr bilat    Strength (MMT)   Hip flex 4/5 bilat  Knee ext 5/5 bilat  Knee flex 5/5 bilat  Ankle DF 5/5 bilat  Hip abd R 3+/5; L 4/5  Hip add 3+/5 bilat    Flexibility Summary: WNL CAROL LE except Right Piriformis    Special Tests  SLR neg bilat    Today's Treatment and Response:   Patient education was provided on objective findings of external evaluation and expectations with treatment outcomes. Educated on pelvic anatomy; body mechanics for sit to stand, side lying to sit and log rolling; sitting posture and use of lumbar towel roll especially for driving.    Patient was instructed in and issued a HEP for: standing lumbar extension (as needed especially after sitting); seated piriformis stretch right (as tolerated)    Charges: PT Eval Low Complexity, 1 TA      Total Timed Treatment: 15 min     Total Treatment Time: 40 min     Based on clinical rationale and outcome measures, this evaluation involved Low Complexity decision making due to 1-2 personal factors/comorbidities, 3 body structures involved/activity limitations, and evolving symptoms including  changing pain  PLAN OF CARE:    Goals: (to be met in 12 visits)  Pt will improve transversus abdominis recruitment to perform proper isometric contraction without requiring  verbal or tactile cuing to promote advancement of therex   Pt will demonstrate good understanding of proper posture and body mechanics to decrease pain and improve spinal safety   Pt will improve lumbar spine AROM flexion without LE symptoms to improve ability to don/doff socks/shoes  Pt will report improved symptom centralization and absence of radicular symptoms for 3 consecutive days to improve function with ADL   Pt will have decreased piriformis mm tension to tolerate sitting >30 minutes for driving and home activities    Pt will be independent and compliant with comprehensive HEP to maintain progress achieved in PT      Frequency / Duration: Patient will be seen for 2 x/week or a total of 12 visits over a 90 day period.  Treatment will include: Manual Therapy, Neuromuscular Re-education, Therapeutic Activities, Therapeutic Exercise, and Home Exercise Program instruction     Education or treatment limitation: None  Rehab Potential:good      Patient/Family/Caregiver was advised of these findings, precautions, and treatment options and has agreed to actively participate in planning and for this course of care.    Thank you for your referral. Please co-sign or sign and return this letter via fax as soon as possible to [phone]. If you have any questions, please contact me at Dept: [phone]    Sincerely,  Electronically signed by therapist: Barby Mayers, PT  Physician's certification required: Yes  I certify the need for these services furnished under this plan of treatment and while under my care.    X___________________________________________________ Date____________________    Certification From: 1/29/2024  To:4/28/2024

## 2024-01-30 ENCOUNTER — ROUTINE PRENATAL (OUTPATIENT)
Dept: OBGYN CLINIC | Facility: CLINIC | Age: 34
End: 2024-01-30
Payer: COMMERCIAL

## 2024-01-30 VITALS
HEART RATE: 82 BPM | BODY MASS INDEX: 29 KG/M2 | DIASTOLIC BLOOD PRESSURE: 75 MMHG | SYSTOLIC BLOOD PRESSURE: 117 MMHG | WEIGHT: 147 LBS

## 2024-01-30 DIAGNOSIS — Z34.03 ENCOUNTER FOR SUPERVISION OF NORMAL FIRST PREGNANCY IN THIRD TRIMESTER: Primary | ICD-10-CM

## 2024-01-30 LAB
APPEARANCE: CLEAR
GLUCOSE (URINE DIPSTICK): NEGATIVE MG/DL
MULTISTIX LOT#: NORMAL NUMERIC
NITRITE, URINE: NEGATIVE
URINE-COLOR: YELLOW

## 2024-01-30 PROCEDURE — 81002 URINALYSIS NONAUTO W/O SCOPE: CPT | Performed by: OBSTETRICS & GYNECOLOGY

## 2024-01-30 PROCEDURE — 99213 OFFICE O/P EST LOW 20 MIN: CPT | Performed by: OBSTETRICS & GYNECOLOGY

## 2024-01-30 PROCEDURE — 3078F DIAST BP <80 MM HG: CPT | Performed by: OBSTETRICS & GYNECOLOGY

## 2024-01-30 PROCEDURE — 3074F SYST BP LT 130 MM HG: CPT | Performed by: OBSTETRICS & GYNECOLOGY

## 2024-02-01 ENCOUNTER — OFFICE VISIT (OUTPATIENT)
Dept: PHYSICAL THERAPY | Age: 34
End: 2024-02-01
Attending: OBSTETRICS & GYNECOLOGY
Payer: COMMERCIAL

## 2024-02-01 PROCEDURE — 97112 NEUROMUSCULAR REEDUCATION: CPT

## 2024-02-01 PROCEDURE — 97140 MANUAL THERAPY 1/> REGIONS: CPT

## 2024-02-01 NOTE — PROGRESS NOTES
Diagnosis:      Sciatic nerve pain, unspecified laterality (M54.30)  Pregnancy, unspecified gestational age (Z34.90)    Referring Provider: Page  Date of Evaluation:    1/29/2024    Precautions:   37 weeks pregnant Next MD visit:   none scheduled  Date of Surgery: n/a   Insurance Primary/Secondary: [employer]ET[employer] INS / MEDICAID     # Auth Visits: no auth, no limt            Subjective: Pt states that she tried the little tips from PT the other day and it seemed to help.  Today, pain is more down the left leg.    Pain: 4/10 current, left LE      Objective:   Tx: See daily treatment log below  Pt education and instructions for core 4 muscles in side lying, sitting, supine and quadruped.  Udpated and reviewed HEP.      Assessment: Pt with tenderness at bilat piriformis today but no increased LE symptoms with STM.  Pt demonstrated good understanding of core muscle contractions and will continue to practice this at home.  No c/o increased pain during treatment today.      Goals:    (to be met in 12 visits)  Pt will improve transversus abdominis recruitment to perform proper isometric contraction without requiring verbal or tactile cuing to promote advancement of therex   Pt will demonstrate good understanding of proper posture and body mechanics to decrease pain and improve spinal safety   Pt will improve lumbar spine AROM flexion without LE symptoms to improve ability to don/doff socks/shoes  Pt will report improved symptom centralization and absence of radicular symptoms for 3 consecutive days to improve function with ADL   Pt will have decreased piriformis mm tension to tolerate sitting >30 minutes for driving and home activities    Pt will be independent and compliant with comprehensive HEP to maintain progress achieved in PT    Plan: Continue manual treatments and progress core stabilization in various positions and functional activities.   Date: 2/1/2024  TX#: 2/12 Date:                 TX#: 3/ Date:                 TX#: 4/  Date:                 TX#: 5/ Date:   Tx#: 6/   Man tx: 15 min  STM to bilat piriformis, glut med       NMR: 25 min  Instructions for core 4 muscles  Core contractions in left side lying, supine, sitting 5\" 10x each  BKFO with core 10x R/L   Quadruped core contractions 5\" 10x  Quadruped cat/cow 10x  Quadruped with thoracic rotation 10x R/L                       HEP: 2/1/24: Access Code: [email or web address].  Practice core muscle contractions, quadruped cat/cow, quadruped thoracic rotation    Charges: 1 Man, 2 NMR       Total Timed Treatment: 40 min  Total Treatment Time: 40 min

## 2024-02-06 ENCOUNTER — APPOINTMENT (OUTPATIENT)
Dept: PHYSICAL THERAPY | Age: 34
End: 2024-02-06
Attending: OBSTETRICS & GYNECOLOGY
Payer: COMMERCIAL

## 2024-02-06 ENCOUNTER — TELEPHONE (OUTPATIENT)
Dept: PHYSICAL THERAPY | Facility: HOSPITAL | Age: 34
End: 2024-02-06

## 2024-02-08 ENCOUNTER — ROUTINE PRENATAL (OUTPATIENT)
Dept: OBGYN CLINIC | Facility: CLINIC | Age: 34
End: 2024-02-08
Payer: MEDICAID

## 2024-02-08 VITALS
HEART RATE: 94 BPM | WEIGHT: 150.81 LBS | SYSTOLIC BLOOD PRESSURE: 118 MMHG | BODY MASS INDEX: 29 KG/M2 | DIASTOLIC BLOOD PRESSURE: 79 MMHG

## 2024-02-08 DIAGNOSIS — Z34.03 ENCOUNTER FOR SUPERVISION OF NORMAL FIRST PREGNANCY IN THIRD TRIMESTER (HCC): Primary | ICD-10-CM

## 2024-02-08 LAB
APPEARANCE: CLEAR
BILIRUBIN: NEGATIVE
GLUCOSE (URINE DIPSTICK): NEGATIVE MG/DL
KETONES (URINE DIPSTICK): NEGATIVE MG/DL
MULTISTIX LOT#: ABNORMAL NUMERIC
NITRITE, URINE: NEGATIVE
OCCULT BLOOD: NEGATIVE
PH, URINE: 7.5 (ref 4.5–8)
SPECIFIC GRAVITY: 1.01 (ref 1–1.03)
URINE-COLOR: YELLOW
UROBILINOGEN,SEMI-QN: 0.2 MG/DL (ref 0–1.9)

## 2024-02-08 PROCEDURE — 81002 URINALYSIS NONAUTO W/O SCOPE: CPT | Performed by: OBSTETRICS & GYNECOLOGY

## 2024-02-09 ENCOUNTER — OFFICE VISIT (OUTPATIENT)
Dept: PHYSICAL THERAPY | Age: 34
End: 2024-02-09
Attending: OBSTETRICS & GYNECOLOGY
Payer: COMMERCIAL

## 2024-02-09 PROCEDURE — 97140 MANUAL THERAPY 1/> REGIONS: CPT

## 2024-02-09 PROCEDURE — 97112 NEUROMUSCULAR REEDUCATION: CPT

## 2024-02-09 NOTE — PROGRESS NOTES
Diagnosis:      Sciatic nerve pain, unspecified laterality (M54.30)  Pregnancy, unspecified gestational age (Z34.90)    Referring Provider: Page  Date of Evaluation:    1/29/2024    Precautions:   37 weeks pregnant Next MD visit:   none scheduled  Date of Surgery: n/a   Insurance Primary/Secondary: [employer]ET[employer] INS / MEDICAID     # Auth Visits: no auth, no limt            Subjective: Pt states that she's feeling better.  She had one day that she was hurting a lot but she was sitting a lot that day.  She was trying to position herself in a better position.  She is doing her exercises in the mornings.     Pain:  2/10 at right low back       Objective:   Tx: See daily treatment log below  Pt education regarding laboring and posture for nursing baby, body mechanics for lifting/carrying car seat carrier.        Assessment: Pt is engaging core well with activities and demonstrated good understanding of squatting mechanics for lifting things from floor.  She had less tenderness today bilat at piriformis with STM.        Goals:    (to be met in 12 visits)  Pt will improve transversus abdominis recruitment to perform proper isometric contraction without requiring verbal or tactile cuing to promote advancement of therex   Pt will demonstrate good understanding of proper posture and body mechanics to decrease pain and improve spinal safety   Pt will improve lumbar spine AROM flexion without LE symptoms to improve ability to don/doff socks/shoes  Pt will report improved symptom centralization and absence of radicular symptoms for 3 consecutive days to improve function with ADL   Pt will have decreased piriformis mm tension to tolerate sitting >30 minutes for driving and home activities    Pt will be independent and compliant with comprehensive HEP to maintain progress achieved in PT    Plan: Continue manual treatments and progress core stabilization in various positions and functional activities.   Date: 2/1/2024  TX#: 2/12 Date:   2/9/24               TX#: 3/12 Date:                 TX#: 4/ Date:                 TX#: 5/ Date:   Tx#: 6/   Man tx: 15 min  STM to bilat piriformis, glut med Man tx: 15 min  STM to bilat piriformis, glut med      NMR: 25 min  Instructions for core 4 muscles  Core contractions in left side lying, supine, sitting 5\" 10x each  BKFO with core 10x R/L   Quadruped core contractions 5\" 10x  Quadruped cat/cow 10x  Quadruped with thoracic rotation 10x R/L   NMR:  Supine BKFO bilat with core 10x  S/L clams 10x R/L  S/L reverse clams 10x R/L    Seated on green SB: pelvic circles 10x CW/CCW  Seated on green SB with UE reaches 10x   Squatting with core 10x                    HEP: 2/1/24: Access Code: [email or web address].  Practice core muscle contractions, quadruped cat/cow, quadruped thoracic rotation    Charges: 1 Man, 2 NMR       Total Timed Treatment: 40 min  Total Treatment Time: 40 min

## 2024-02-12 ENCOUNTER — OFFICE VISIT (OUTPATIENT)
Dept: PHYSICAL THERAPY | Age: 34
End: 2024-02-12
Attending: OBSTETRICS & GYNECOLOGY
Payer: COMMERCIAL

## 2024-02-12 PROCEDURE — 97140 MANUAL THERAPY 1/> REGIONS: CPT

## 2024-02-12 PROCEDURE — 97112 NEUROMUSCULAR REEDUCATION: CPT

## 2024-02-12 NOTE — PROGRESS NOTES
Diagnosis:      Sciatic nerve pain, unspecified laterality (M54.30)  Pregnancy, unspecified gestational age (Z34.90)    Referring Provider: Page  Date of Evaluation:    1/29/2024    Precautions:   37 weeks pregnant Next MD visit:   none scheduled  Date of Surgery: n/a   Insurance Primary/Secondary: [employer] INS / MEDICAID     # Auth Visits: no auth, no limt            Subjective: Pt states that she's feeling pretty good.  She notices she is worse if she sits too long or if she sits in a hard chair.       Pain:  2/10 current       Objective:   Tx: See daily treatment log below    Assessment: Pt demonstrated good body mechanics for hip hinging and squatting.  Cued to inhale/exhale for sit to stand to assist with core muscle contractions with sit to stand.        Goals:    (to be met in 12 visits)  Pt will improve transversus abdominis recruitment to perform proper isometric contraction without requiring verbal or tactile cuing to promote advancement of therex   Pt will demonstrate good understanding of proper posture and body mechanics to decrease pain and improve spinal safety   Pt will improve lumbar spine AROM flexion without LE symptoms to improve ability to don/doff socks/shoes  Pt will report improved symptom centralization and absence of radicular symptoms for 3 consecutive days to improve function with ADL   Pt will have decreased piriformis mm tension to tolerate sitting >30 minutes for driving and home activities    Pt will be independent and compliant with comprehensive HEP to maintain progress achieved in PT    Plan: Continue manual treatments and progress core stabilization in various positions and functional activities.   Date: 2/1/2024  TX#: 2/12 Date:  2/9/24               TX#: 3/12 Date:   2/12/24              TX#: 4/12 Date:                 TX#: 5/ Date:   Tx#: 6/   Man tx: 15 min  STM to bilat piriformis, glut med Man tx: 15 min  STM to bilat piriformis, glut med Man tx: 15 min  STM to bilat  piriformis, glut med     NMR: 25 min  Instructions for core 4 muscles  Core contractions in left side lying, supine, sitting 5\" 10x each  BKFO with core 10x R/L   Quadruped core contractions 5\" 10x  Quadruped cat/cow 10x  Quadruped with thoracic rotation 10x R/L   NMR:  Supine BKFO bilat with core 10x  S/L clams 10x R/L  S/L reverse clams 10x R/L    Seated on green SB: pelvic circles 10x CW/CCW  Seated on green SB with UE reaches 10x   Squatting with core 10x NMR:  Supine BKFO bilat with core 10x  Supine march with core 10x  S/L clams 10x R/L  S/L reverse clams 10x R/L  Quadruped cat/cow 10x  Quadruped with thoracic rotation 5x R/L  Quadruped alt UE 5x R/L   Seated on green SB: pelvic circles 10x CW/CCW  Seated on green SB with UE reaches 10x   Wall squat with green SB with core 10x  Sit to stand with core 5x  Hip hinge to drawer 5x  Golfers lift to floor 3x                   HEP: 2/1/24: Access Code: [email or web address].  Practice core muscle contractions, quadruped cat/cow, quadruped thoracic rotation    Charges: 1 Man, 2 NMR       Total Timed Treatment: 40 min  Total Treatment Time: 40 min

## 2024-02-13 ENCOUNTER — OFFICE VISIT (OUTPATIENT)
Dept: RHEUMATOLOGY | Facility: CLINIC | Age: 34
End: 2024-02-13
Payer: COMMERCIAL

## 2024-02-13 ENCOUNTER — OFFICE VISIT (OUTPATIENT)
Dept: PHYSICAL THERAPY | Age: 34
End: 2024-02-13
Attending: OBSTETRICS & GYNECOLOGY
Payer: COMMERCIAL

## 2024-02-13 VITALS
DIASTOLIC BLOOD PRESSURE: 73 MMHG | WEIGHT: 150 LBS | HEART RATE: 89 BPM | HEIGHT: 60 IN | SYSTOLIC BLOOD PRESSURE: 116 MMHG | BODY MASS INDEX: 29.45 KG/M2

## 2024-02-13 DIAGNOSIS — M06.09 RHEUMATOID ARTHRITIS OF MULTIPLE SITES WITH NEGATIVE RHEUMATOID FACTOR (HCC): ICD-10-CM

## 2024-02-13 DIAGNOSIS — M25.50 POLYARTHRALGIA: Primary | ICD-10-CM

## 2024-02-13 PROCEDURE — 97112 NEUROMUSCULAR REEDUCATION: CPT

## 2024-02-13 PROCEDURE — 99214 OFFICE O/P EST MOD 30 MIN: CPT | Performed by: INTERNAL MEDICINE

## 2024-02-13 PROCEDURE — 97140 MANUAL THERAPY 1/> REGIONS: CPT

## 2024-02-13 NOTE — PROGRESS NOTES
Diagnosis:      Sciatic nerve pain, unspecified laterality (M54.30)  Pregnancy, unspecified gestational age (Z34.90)    Referring Provider: Page  Date of Evaluation:    1/29/2024    Precautions:   37 weeks pregnant Next MD visit:   none scheduled  Date of Surgery: n/a   Insurance Primary/Secondary: [employer] INS / MEDICAID     # Auth Visits: no auth, no limt            Subjective: Pt states that she's worse today than yesterday.  Feeling more stiffness on both sides.     Pain:  2/10 current       Objective:   Tx: See daily treatment log below  Reviewed body mechanics and posture    Assessment: Pt is doing well with core stabilization activities.  She had mild tenderness at right glut med today.  Voiced understanding of body mechanics and posture for ADLs during pregnancy and post-partum.        Goals:    (to be met in 12 visits)  Pt will improve transversus abdominis recruitment to perform proper isometric contraction without requiring verbal or tactile cuing to promote advancement of therex   Pt will demonstrate good understanding of proper posture and body mechanics to decrease pain and improve spinal safety   Pt will improve lumbar spine AROM flexion without LE symptoms to improve ability to don/doff socks/shoes  Pt will report improved symptom centralization and absence of radicular symptoms for 3 consecutive days to improve function with ADL   Pt will have decreased piriformis mm tension to tolerate sitting >30 minutes for driving and home activities    Pt will be independent and compliant with comprehensive HEP to maintain progress achieved in PT    Plan: Continue PT until pt gives birth to her baby and continue as needed post-partum   Date: 2/1/2024  TX#: 2/12 Date:  2/9/24               TX#: 3/12 Date:   2/12/24              TX#: 4/12 Date:  2/13/24               TX#: 5/12 Date:   Tx#: 6/   Man tx: 15 min  STM to bilat piriformis, glut med Man tx: 15 min  STM to bilat piriformis, glut med Man tx: 15 min  STM to  I have reviewed the patient's visit today including history, exam and assessment by DANIELITO Estevez. I agree with treatment/plan as above.     Vince Chan MD  12:17 PM  08/14/23 bilat piriformis, glut med Man tx: 18 min  STM to bilat piriformis, glut med, lumbar paraspinals    NMR: 25 min  Instructions for core 4 muscles  Core contractions in left side lying, supine, sitting 5\" 10x each  BKFO with core 10x R/L   Quadruped core contractions 5\" 10x  Quadruped cat/cow 10x  Quadruped with thoracic rotation 10x R/L   NMR:  Supine BKFO bilat with core 10x  S/L clams 10x R/L  S/L reverse clams 10x R/L    Seated on green SB: pelvic circles 10x CW/CCW  Seated on green SB with UE reaches 10x   Squatting with core 10x NMR:  Supine BKFO bilat with core 10x  Supine march with core 10x  S/L clams 10x R/L  S/L reverse clams 10x R/L  Quadruped cat/cow 10x  Quadruped with thoracic rotation 5x R/L  Quadruped alt UE 5x R/L   Seated on green SB: pelvic circles 10x CW/CCW  Seated on green SB with UE reaches 10x   Wall squat with green SB with core 10x  Sit to stand with core 5x  Hip hinge to drawer 5x  Golfers lift to floor 3x NMR: 23 min  Quadruped cat/cow 10x  Quadruped with thoracic rotation 5x R/L  Quadruped alt UE 5x R/L   Supine march with core 10x  Seated on green SB: pelvic circles 10x CW/CCW  Seated on green SB pelvic tilts 10x  Seated on green SB with UE reaches 10x   Seated on green SB with LE march 10x  Wall squat with green SB with core 10x                      HEP: 2/1/24: Access Code: [email or web address].  Practice core muscle contractions, quadruped cat/cow, quadruped thoracic rotation    Charges: 1 Man, 2 NMR       Total Timed Treatment: 41 min  Total Treatment Time: 41 min

## 2024-02-13 NOTE — PATIENT INSTRUCTIONS
You were seen today for rheumatoid arthritis  Could consider putting you on hydroxychloroquine which is used to treat rheumatoid arthritis  Plan to follow-up in May and see how your joints are doing at that time

## 2024-02-13 NOTE — PROGRESS NOTES
Rossy Johnson is a 33 year old female.    HPI:     Chief Complaint   Patient presents with    Follow - Up     Polyarthralgia      Hand Pain     Frank Hand pain and Right hand is swollen     Ankle Pain     Frank Ankle swollen        I had the pleasure of seeing Rossy Johnson on 2/13/2024 for follow up Polyarthralgia's 2/2 Seronegative RA    Current Medications:  none  Blood work:  Neg RF, CCP, HLA-B27  Neg APL panel  US b/l hands: Synovial thickening and hyperemia within the bilateral index and middle finger MCP.  Also on the bilateral radiolunate articulations.  Findings suspicious for inflammatory arthritis    Interval History:  This is a 33 yo F with hx of HLD, Migraine's referred for abnormal blood work and joint pain.  She currently is 19 weeks pregnant.  Started to develop joint pain in April prior to being pregnant.  Reports pain involving her hands, wrists, knees and feet.  She wakes in the morning with joint pain and feeling very sore.  Muscles can hurt to.  No swelling in her joints.  They do feel stiff.  Her ankle pain is significant, has not improved since April.  Feels weakness in her right hand.  Denies any rash or psoriasis.  Has some pain in her lower spine.  Currently not taking any medications for pain.  She was found to have abnormal blood work, positive PTT lupus anticoagulant but overall lupus anticoagulant was not detected.  Her mom has lupus.  She denies any rashes, sicca symptoms, oral ulcers, hair loss, lymphadenopathy, serositis, DVT or PE, miscarriages or RP.  Denies any history of stroke.  Denies any history of antiphospholipid syndrome in the family.    2/13/2024:  Presents for f/u of polyarthralgia's  Has pain in the hands and ankles. Has stiffness in the hands in the morning. Cannot make fist with right hand in the morning  No pain in shoulders or elbows or knees  Pain is not severe  No rash or psoriasis  No lower back pain   Mom has RA and SLE          HISTORY:  Past Medical  History:   Diagnosis Date    Decorative tattoo     Fibroids       Social Hx Reviewed   Family Hx Reviewed     Medications (Active prior to today's visit):  Current Outpatient Medications   Medication Sig Dispense Refill    nystatin-triamcinolone 100,000-0.1 Units/g-% External Ointment Apply 1 Application topically 2 (two) times daily. 15 g 0    aspirin 81 MG Oral Tab EC Take 1 tablet (81 mg total) by mouth daily.      Ferrous Sulfate (SLOW FE OR) Take 85 mg by mouth. Pt unsure of brand and dosage      prenatal vitamin with DHA 27-0.8-228 MG Oral Cap Take 1 capsule by mouth daily.       .cmed  Allergies:  No Known Allergies      ROS:   All other ROS are negative.     PHYSICAL EXAM:   GEN: AAOx3, NAD  HEENT: EOMI, PERRLA, no injection or icterus, oral mucosa moist, no oral lesions. No lymphadenopathy. No facial rash  CVS: RRR, no murmurs rubs or gallops. Equal 2+ distal pulses.   LUNGS: CTAB, no increased work of breathing  ABDOMEN:  soft NT/ND, +BS, no HSM  SKIN: No rashes or skin lesions. No nail findings  MSK:  Cervical spine: FROM  Hands: no synovitis in DIP, PIP and MCP, strong full fists  Wrist: FROM, no pain or swelling or warmth on palpation  Elbow: FROM, no pain or swelling or warmth on palpation  Shoulders: FROM, no pain or swelling or warmth on palpation  Hip: normal log roll, no lateral hip pain, KATHY test negative b/l  Knees: FROM, no warmth or effusion present. No pain with ROM.   Ankles: FROM, no pain or swelling or warmth on palpation  Feet: no pain with MTP squeeze, no toe swelling or pain or warmth on palpation with FROM  Spine: no lumbar or sacral pain on palpation.  NEURO: Cranial nerves II-XII intact grossly. 5/5 strength throughout in both upper and lower extremities, sensation intact.  PSYCH: normal mood       LABS:     Component      Latest Ref Rng 9/28/2023   Lupus Anticoag Screen Interp Conclusion: Negative…    PT      11.6 - 14.8 seconds 13.2    APTT      23.3 - 35.6 seconds 35.4     Hexagonal Phase Phospholipid      Negative  Negative    DRVVT Ratio      0.0 - 1.1  1.0    BETA 2 GLYCOPROTEIN 1 AB, IgG      <7 U/mL <0.8    BETA 2 GLYCOPROTEIN 1 AB, IgM      <7 U/mL <2.4    Cardiolipin IgG Antibody      <10 GPL 1.2    Cardiolipin IgM Antibody      <10 MPL 2.5    RHEUMATOID FACTOR      <15 IU/mL <10    HLA-B27 Negative        Imaging:     US b/l hands 1/2024:  Synovial thickening with hyperemia within the bilateral index and middle finger MCP joints and left thumb carpometacarpal joint.      Synovial thickening and hyperemia also seen involving the bilateral radiolunate articulations.      Overall constellation of findings are suspicious for inflammatory arthritis.      No joint effusion or osseous erosion.      Mild tenosynovitis of the bilateral index and middle finger flexor digitorum tendon sheaths.     ASSESSMENT/PLAN:     Polyarthralgia secondary to seronegative RA  - She started to have joint pain starting April 2023 in her hands, wrists, knees, ankles and feet  - Initial workup showed negative ESR, CRP, RF, CCP and HLA-B27  - She continued to have joint pain, ultrasound the hands were done showing evidence of hyperemia and active synovitis  - She is currently 39 weeks pregnant.  Her joint pain is not as severe.  She would like to hold off on medications right now  - Could consider hydroxychloroquine.  Patient provided with information regarding hydroxychloroquine    Positive PTT lupus anticoagulant  - Overall her lupus anticoagulant was negative.  No symptoms consistent with antiphospholipid syndrome.  Repeat APL panel negative     Pt will f/u in 3 mos     Mayra Suarez MD  2/13/2024   10:52 AM

## 2024-02-14 ENCOUNTER — ROUTINE PRENATAL (OUTPATIENT)
Dept: OBGYN CLINIC | Facility: CLINIC | Age: 34
End: 2024-02-14

## 2024-02-14 VITALS
HEART RATE: 71 BPM | BODY MASS INDEX: 30 KG/M2 | WEIGHT: 152.63 LBS | SYSTOLIC BLOOD PRESSURE: 120 MMHG | DIASTOLIC BLOOD PRESSURE: 68 MMHG

## 2024-02-14 DIAGNOSIS — Z34.93 ENCOUNTER FOR SUPERVISION OF NORMAL PREGNANCY IN THIRD TRIMESTER, UNSPECIFIED GRAVIDITY: Primary | ICD-10-CM

## 2024-02-14 LAB
APPEARANCE: CLEAR
BILIRUBIN: NEGATIVE
GLUCOSE (URINE DIPSTICK): NEGATIVE MG/DL
KETONES (URINE DIPSTICK): NEGATIVE MG/DL
LEUKOCYTES: NEGATIVE
MULTISTIX LOT#: NORMAL NUMERIC
NITRITE, URINE: NEGATIVE
OCCULT BLOOD: NEGATIVE
PH, URINE: 7 (ref 4.5–8)
SPECIFIC GRAVITY: 1.01 (ref 1–1.03)
URINE-COLOR: YELLOW
UROBILINOGEN,SEMI-QN: 0.2 MG/DL (ref 0–1.9)

## 2024-02-14 PROCEDURE — 81002 URINALYSIS NONAUTO W/O SCOPE: CPT | Performed by: OBSTETRICS & GYNECOLOGY

## 2024-02-16 ENCOUNTER — APPOINTMENT (OUTPATIENT)
Dept: PHYSICAL THERAPY | Age: 34
End: 2024-02-16
Attending: OBSTETRICS & GYNECOLOGY
Payer: COMMERCIAL

## 2024-02-21 ENCOUNTER — LAB ENCOUNTER (OUTPATIENT)
Dept: LAB | Facility: HOSPITAL | Age: 34
End: 2024-02-21
Attending: OBSTETRICS & GYNECOLOGY
Payer: COMMERCIAL

## 2024-02-21 ENCOUNTER — ROUTINE PRENATAL (OUTPATIENT)
Dept: OBGYN CLINIC | Facility: CLINIC | Age: 34
End: 2024-02-21
Payer: COMMERCIAL

## 2024-02-21 VITALS
HEART RATE: 76 BPM | DIASTOLIC BLOOD PRESSURE: 80 MMHG | BODY MASS INDEX: 30 KG/M2 | WEIGHT: 152.81 LBS | SYSTOLIC BLOOD PRESSURE: 130 MMHG

## 2024-02-21 DIAGNOSIS — Z34.91 ENCOUNTER FOR SUPERVISION OF NORMAL PREGNANCY IN FIRST TRIMESTER, UNSPECIFIED GRAVIDITY (HCC): Primary | ICD-10-CM

## 2024-02-21 DIAGNOSIS — O16.3: ICD-10-CM

## 2024-02-21 LAB
ALBUMIN SERPL-MCNC: 3.8 G/DL (ref 3.2–4.8)
ALBUMIN/GLOB SERPL: 1.4 {RATIO} (ref 1–2)
ALP LIVER SERPL-CCNC: 190 U/L
ALT SERPL-CCNC: 12 U/L
ANION GAP SERPL CALC-SCNC: 7 MMOL/L (ref 0–18)
APPEARANCE: CLEAR
AST SERPL-CCNC: 19 U/L (ref ?–34)
BILIRUB SERPL-MCNC: 0.2 MG/DL (ref 0.3–1.2)
BILIRUBIN: NEGATIVE
BUN BLD-MCNC: 5 MG/DL (ref 9–23)
BUN/CREAT SERPL: 8.8 (ref 10–20)
CALCIUM BLD-MCNC: 9.6 MG/DL (ref 8.7–10.4)
CHLORIDE SERPL-SCNC: 108 MMOL/L (ref 98–112)
CO2 SERPL-SCNC: 22 MMOL/L (ref 21–32)
CREAT BLD-MCNC: 0.57 MG/DL
CREAT UR-SCNC: 20.4 MG/DL
DEPRECATED RDW RBC AUTO: 42.7 FL (ref 35.1–46.3)
EGFRCR SERPLBLD CKD-EPI 2021: 124 ML/MIN/1.73M2 (ref 60–?)
ERYTHROCYTE [DISTWIDTH] IN BLOOD BY AUTOMATED COUNT: 13 % (ref 11–15)
FASTING STATUS PATIENT QL REPORTED: YES
GLOBULIN PLAS-MCNC: 2.8 G/DL (ref 2.8–4.4)
GLUCOSE (URINE DIPSTICK): NEGATIVE MG/DL
GLUCOSE BLD-MCNC: 72 MG/DL (ref 70–99)
HCT VFR BLD AUTO: 35.2 %
HGB BLD-MCNC: 12.5 G/DL
KETONES (URINE DIPSTICK): NEGATIVE MG/DL
MCH RBC QN AUTO: 31.6 PG (ref 26–34)
MCHC RBC AUTO-ENTMCNC: 35.5 G/DL (ref 31–37)
MCV RBC AUTO: 89.1 FL
MULTISTIX LOT#: ABNORMAL NUMERIC
NITRITE, URINE: NEGATIVE
OCCULT BLOOD: NEGATIVE
OSMOLALITY SERPL CALC.SUM OF ELEC: 280 MOSM/KG (ref 275–295)
PH, URINE: 6.5 (ref 4.5–8)
PLATELET # BLD AUTO: 218 10(3)UL (ref 150–450)
POTASSIUM SERPL-SCNC: 4 MMOL/L (ref 3.5–5.1)
PROT SERPL-MCNC: 6.6 G/DL (ref 5.7–8.2)
PROT UR-MCNC: 10.5 MG/DL (ref ?–14)
PROT/CREAT UR-RTO: 0.51
PROTEIN (URINE DIPSTICK): 30 MG/DL
RBC # BLD AUTO: 3.95 X10(6)UL
SODIUM SERPL-SCNC: 137 MMOL/L (ref 136–145)
SPECIFIC GRAVITY: 1.02 (ref 1–1.03)
URINE-COLOR: YELLOW
UROBILINOGEN,SEMI-QN: 0.2 MG/DL (ref 0–1.9)
WBC # BLD AUTO: 7.1 X10(3) UL (ref 4–11)

## 2024-02-21 PROCEDURE — 84156 ASSAY OF PROTEIN URINE: CPT

## 2024-02-21 PROCEDURE — 85027 COMPLETE CBC AUTOMATED: CPT

## 2024-02-21 PROCEDURE — 80053 COMPREHEN METABOLIC PANEL: CPT

## 2024-02-21 PROCEDURE — 59425 ANTEPARTUM CARE ONLY: CPT | Performed by: OBSTETRICS & GYNECOLOGY

## 2024-02-21 PROCEDURE — 81002 URINALYSIS NONAUTO W/O SCOPE: CPT | Performed by: OBSTETRICS & GYNECOLOGY

## 2024-02-21 PROCEDURE — 82570 ASSAY OF URINE CREATININE: CPT

## 2024-02-21 PROCEDURE — 36415 COLL VENOUS BLD VENIPUNCTURE: CPT

## 2024-02-24 ENCOUNTER — HOSPITAL ENCOUNTER (INPATIENT)
Facility: HOSPITAL | Age: 34
LOS: 5 days | Discharge: HOME | End: 2024-02-29
Attending: OBSTETRICS & GYNECOLOGY | Admitting: OBSTETRICS & GYNECOLOGY
Payer: COMMERCIAL

## 2024-02-24 PROBLEM — Z34.90 PREGNANCY (HCC): Status: ACTIVE | Noted: 2024-02-24

## 2024-02-24 LAB
ALBUMIN SERPL-MCNC: 3.6 G/DL (ref 3.2–4.8)
ALBUMIN/GLOB SERPL: 1.2 {RATIO} (ref 1–2)
ALP LIVER SERPL-CCNC: 186 U/L
ALT SERPL-CCNC: 13 U/L
ANION GAP SERPL CALC-SCNC: 10 MMOL/L (ref 0–18)
ANTIBODY SCREEN: NEGATIVE
BASOPHILS # BLD AUTO: 0.02 X10(3) UL (ref 0–0.2)
BASOPHILS NFR BLD AUTO: 0.3 %
BILIRUB SERPL-MCNC: 0.3 MG/DL (ref 0.3–1.2)
CALCIUM BLD-MCNC: 9.2 MG/DL (ref 8.7–10.4)
CHLORIDE SERPL-SCNC: 110 MMOL/L (ref 98–112)
CO2 SERPL-SCNC: 19 MMOL/L (ref 21–32)
CREAT BLD-MCNC: 0.52 MG/DL
CREAT UR-SCNC: 29.6 MG/DL
DEPRECATED RDW RBC AUTO: 43.4 FL (ref 35.1–46.3)
EGFRCR SERPLBLD CKD-EPI 2021: 127 ML/MIN/1.73M2 (ref 60–?)
EOSINOPHIL # BLD AUTO: 0.06 X10(3) UL (ref 0–0.7)
EOSINOPHIL NFR BLD AUTO: 0.8 %
ERYTHROCYTE [DISTWIDTH] IN BLOOD BY AUTOMATED COUNT: 13.1 % (ref 11–15)
GLOBULIN PLAS-MCNC: 3 G/DL (ref 2.8–4.4)
GLUCOSE BLD-MCNC: 113 MG/DL (ref 70–99)
HCT VFR BLD AUTO: 35.6 %
HGB BLD-MCNC: 12.2 G/DL
IMM GRANULOCYTES # BLD AUTO: 0.03 X10(3) UL (ref 0–1)
IMM GRANULOCYTES NFR BLD: 0.4 %
LYMPHOCYTES # BLD AUTO: 1.44 X10(3) UL (ref 1–4)
LYMPHOCYTES NFR BLD AUTO: 19.9 %
MCH RBC QN AUTO: 30.7 PG (ref 26–34)
MCHC RBC AUTO-ENTMCNC: 34.3 G/DL (ref 31–37)
MCV RBC AUTO: 89.7 FL
MONOCYTES # BLD AUTO: 0.48 X10(3) UL (ref 0.1–1)
MONOCYTES NFR BLD AUTO: 6.6 %
NEUTROPHILS # BLD AUTO: 5.21 X10 (3) UL (ref 1.5–7.7)
NEUTROPHILS # BLD AUTO: 5.21 X10(3) UL (ref 1.5–7.7)
NEUTROPHILS NFR BLD AUTO: 72 %
PLATELET # BLD AUTO: 200 10(3)UL (ref 150–450)
PROT SERPL-MCNC: 6.6 G/DL (ref 5.7–8.2)
PROT UR-MCNC: 15.2 MG/DL (ref ?–14)
PROT/CREAT UR-RTO: 0.51
RBC # BLD AUTO: 3.97 X10(6)UL
RH BLOOD TYPE: POSITIVE
SODIUM SERPL-SCNC: 139 MMOL/L (ref 136–145)
WBC # BLD AUTO: 7.2 X10(3) UL (ref 4–11)

## 2024-02-24 PROCEDURE — 3E0P7VZ INTRODUCTION OF HORMONE INTO FEMALE REPRODUCTIVE, VIA NATURAL OR ARTIFICIAL OPENING: ICD-10-PCS | Performed by: OBSTETRICS & GYNECOLOGY

## 2024-02-24 RX ORDER — CITRIC ACID/SODIUM CITRATE 334-500MG
30 SOLUTION, ORAL ORAL AS NEEDED
Status: COMPLETED | OUTPATIENT
Start: 2024-02-24 | End: 2024-02-26

## 2024-02-24 RX ORDER — ONDANSETRON 2 MG/ML
4 INJECTION INTRAMUSCULAR; INTRAVENOUS EVERY 6 HOURS PRN
Status: DISCONTINUED | OUTPATIENT
Start: 2024-02-24 | End: 2024-02-26

## 2024-02-24 RX ORDER — SODIUM CHLORIDE, SODIUM LACTATE, POTASSIUM CHLORIDE, CALCIUM CHLORIDE 600; 310; 30; 20 MG/100ML; MG/100ML; MG/100ML; MG/100ML
INJECTION, SOLUTION INTRAVENOUS CONTINUOUS
Status: DISCONTINUED | OUTPATIENT
Start: 2024-02-24 | End: 2024-02-26

## 2024-02-24 RX ORDER — LIDOCAINE HYDROCHLORIDE 10 MG/ML
30 INJECTION, SOLUTION EPIDURAL; INFILTRATION; INTRACAUDAL; PERINEURAL ONCE
Status: DISCONTINUED | OUTPATIENT
Start: 2024-02-24 | End: 2024-02-26

## 2024-02-24 RX ORDER — IBUPROFEN 600 MG/1
600 TABLET ORAL ONCE AS NEEDED
Status: DISCONTINUED | OUTPATIENT
Start: 2024-02-24 | End: 2024-02-26

## 2024-02-24 RX ORDER — SODIUM CHLORIDE, SODIUM LACTATE, POTASSIUM CHLORIDE, CALCIUM CHLORIDE 600; 310; 30; 20 MG/100ML; MG/100ML; MG/100ML; MG/100ML
INJECTION, SOLUTION INTRAVENOUS AS NEEDED
Status: DISCONTINUED | OUTPATIENT
Start: 2024-02-24 | End: 2024-02-26

## 2024-02-24 RX ORDER — DEXTROSE, SODIUM CHLORIDE, SODIUM LACTATE, POTASSIUM CHLORIDE, AND CALCIUM CHLORIDE 5; .6; .31; .03; .02 G/100ML; G/100ML; G/100ML; G/100ML; G/100ML
INJECTION, SOLUTION INTRAVENOUS CONTINUOUS
Status: DISCONTINUED | OUTPATIENT
Start: 2024-02-24 | End: 2024-02-26

## 2024-02-24 RX ORDER — ACETAMINOPHEN 500 MG
500 TABLET ORAL EVERY 6 HOURS PRN
Status: DISCONTINUED | OUTPATIENT
Start: 2024-02-24 | End: 2024-02-26

## 2024-02-24 RX ORDER — ACETAMINOPHEN 500 MG
1000 TABLET ORAL EVERY 6 HOURS PRN
Status: DISCONTINUED | OUTPATIENT
Start: 2024-02-24 | End: 2024-02-26

## 2024-02-24 RX ORDER — NALBUPHINE HYDROCHLORIDE 10 MG/ML
10 INJECTION, SOLUTION INTRAMUSCULAR; INTRAVENOUS; SUBCUTANEOUS EVERY 6 HOURS PRN
Status: DISCONTINUED | OUTPATIENT
Start: 2024-02-24 | End: 2024-02-26

## 2024-02-24 RX ORDER — HYDROXYZINE HYDROCHLORIDE 50 MG/ML
50 INJECTION, SOLUTION INTRAMUSCULAR EVERY 6 HOURS PRN
Status: DISCONTINUED | OUTPATIENT
Start: 2024-02-24 | End: 2024-02-26

## 2024-02-24 RX ORDER — TERBUTALINE SULFATE 1 MG/ML
0.25 INJECTION, SOLUTION SUBCUTANEOUS AS NEEDED
Status: DISCONTINUED | OUTPATIENT
Start: 2024-02-24 | End: 2024-02-26

## 2024-02-25 PROCEDURE — 3E033VJ INTRODUCTION OF OTHER HORMONE INTO PERIPHERAL VEIN, PERCUTANEOUS APPROACH: ICD-10-PCS | Performed by: OBSTETRICS & GYNECOLOGY

## 2024-02-25 PROCEDURE — 59200 INSERT CERVICAL DILATOR: CPT | Performed by: OBSTETRICS & GYNECOLOGY

## 2024-02-25 NOTE — H&P
[facility]    History & Physical    Rossy Johnson Patient Status:  Inpatient    1990 MRN [id number]   Location [facility] Attending Rosalva Sahu MD   Hosp Day # 1 PCP No primary care provider on file.     Date of Admission:  2024      HPI:   Rossy Johnson is a 33 year old  female, current EGA of 41w0d with an estimated date of delivery of: 2024, by Ultrasound admitted for IOL for elevated BP's and p/c ratio of 0.5 and postdates.     Review of Systems:   10 point ROS completed and was negative, except for pertinent positive and negatives stated in subjective.    Rossy Johnson is being admitted for induction of labor.    Her current obstetrical history is significant for pre-eclampsia, uterine fibroid- 5cm, anmeia,polyarthralgia- s/p rheumatology consult- may need hydrochloroqine as outpatient .    Patient reports no complaints .     Fetal Movement: Yes    History   Obstetric History:   OB History    Para Term  AB Living   1             SAB IAB Ectopic Multiple Live Births                  # Outcome Date GA Lbr Ronaldo/2nd Weight Sex Delivery Anes PTL Lv   1 Current              Past Medical History:   Past Medical History:   Diagnosis Date    Decorative tattoo     Fibroids     History of anemia     RA (rheumatoid arthritis) ([facility])      Past Surgerical History:   Past Surgical History:   Procedure Laterality Date    IMPLANTABLE BREAST PROSTHESIS       Social History:   Social History     Tobacco Use    Smoking status: Never    Smokeless tobacco: Never   Substance Use Topics    Alcohol use: Not Currently     Comment: 1-2 glasses per month prior to pregnancy      Family History:  No pertinent family history  Allergies/Medications:   Allergies:   No Known Allergies  Medications:  Medications Prior to Admission   Medication Sig Dispense Refill Last Dose    nystatin-triamcinolone 100,000-0.1 Units/g-% External Ointment Apply 1  Application topically 2 (two) times daily. 15 g 0 2/23/2024    aspirin 81 MG Oral Tab EC Take 1 tablet (81 mg total) by mouth daily.   2/23/2024    Ferrous Sulfate (SLOW FE OR) Take 85 mg by mouth. Pt unsure of brand and dosage   2/23/2024    prenatal vitamin with DHA 27-0.8-228 MG Oral Cap Take 1 capsule by mouth daily.          Review of Systems:   As documented in HPI      Physical Exam:   Temp:  [98.3 °F (36.8 °C)-98.8 °F (37.1 °C)] 98.3 °F (36.8 °C)  Pulse:  [71-92] 73  Resp:  [16-18] 18  BP: (104-136)/(61-87) 120/87    Constitutional: alert and cooperative in No distress  Psych:  Alert and oriented  Skin:  Warm and hydrated, No rashes  CV:  No edema  Respiratory:  No labored breathing  Abdomen: gravid nontender  : Vaginal exam:  Dilation: 0 cm    Effacement: 50 %    Station: -3    Position: Cephalic      Musculoskeletal:  No calf tenderness    FHT assessment:   Baseline: 140 bpm   Variability: moderate   Accels:  Yes   Decels: No   Tocos:  Yes   Category: 1 tracing    Results:     Recent Labs   Lab 02/21/24  1241 02/24/24  1326   RBC 3.95 3.97   HGB 12.5 12.2   HCT 35.2 35.6   MCV 89.1 89.7   MCH 31.6 30.7   MCHC 35.5 34.3   RDW 13.0 13.1   NEPRELIM  --  5.21   WBC 7.1 7.2   .0 200.0          No results found.         Assessment/Plan:     Pregnancy (HCC)  IUP@ 41 0/7 weeks    Preeclampsia- BP's stable for now     Not in labor.    Treatment Plan:  Intervention: vaginal/cervical Prostin, may place cooks catheter if n0 significant cervical change but at least 1 cm.  .    Rosalva Sahu MD  2/25/2024  8:28 AM

## 2024-02-26 ENCOUNTER — ANESTHESIA EVENT (OUTPATIENT)
Dept: OBGYN UNIT | Facility: HOSPITAL | Age: 34
End: 2024-02-26
Payer: COMMERCIAL

## 2024-02-26 ENCOUNTER — ANESTHESIA (OUTPATIENT)
Dept: OBGYN UNIT | Facility: HOSPITAL | Age: 34
End: 2024-02-26
Payer: COMMERCIAL

## 2024-02-26 PROBLEM — O14.13 SEVERE PRE-ECLAMPSIA IN THIRD TRIMESTER (HCC): Status: ACTIVE | Noted: 2024-02-26

## 2024-02-26 PROBLEM — O14.03 MILD PRE-ECLAMPSIA IN THIRD TRIMESTER (HCC): Status: ACTIVE | Noted: 2024-02-26

## 2024-02-26 LAB
BASOPHILS # BLD AUTO: 0.02 X10(3) UL (ref 0–0.2)
BASOPHILS NFR BLD AUTO: 0.3 %
DEPRECATED RDW RBC AUTO: 62.9 FL (ref 35.1–46.3)
EOSINOPHIL # BLD AUTO: 0.06 X10(3) UL (ref 0–0.7)
EOSINOPHIL NFR BLD AUTO: 1 %
ERYTHROCYTE [DISTWIDTH] IN BLOOD BY AUTOMATED COUNT: 15.9 % (ref 11–15)
HCT VFR BLD AUTO: 40.5 %
HGB BLD-MCNC: 11.7 G/DL
IMM GRANULOCYTES # BLD AUTO: 0.05 X10(3) UL (ref 0–1)
IMM GRANULOCYTES NFR BLD: 0.8 %
LYMPHOCYTES # BLD AUTO: 1.34 X10(3) UL (ref 1–4)
LYMPHOCYTES NFR BLD AUTO: 21.6 %
MCH RBC QN AUTO: 31 PG (ref 26–34)
MCHC RBC AUTO-ENTMCNC: 28.9 G/DL (ref 31–37)
MCV RBC AUTO: 107.4 FL
MONOCYTES # BLD AUTO: 0.29 X10(3) UL (ref 0.1–1)
MONOCYTES NFR BLD AUTO: 4.7 %
NEUTROPHILS # BLD AUTO: 4.43 X10 (3) UL (ref 1.5–7.7)
NEUTROPHILS # BLD AUTO: 4.43 X10(3) UL (ref 1.5–7.7)
NEUTROPHILS NFR BLD AUTO: 71.6 %
PLATELET # BLD AUTO: 114 10(3)UL (ref 150–450)
RBC # BLD AUTO: 3.77 X10(6)UL
WBC # BLD AUTO: 6.2 X10(3) UL (ref 4–11)

## 2024-02-26 PROCEDURE — 59514 CESAREAN DELIVERY ONLY: CPT | Performed by: OBSTETRICS & GYNECOLOGY

## 2024-02-26 PROCEDURE — 10907ZC DRAINAGE OF AMNIOTIC FLUID, THERAPEUTIC FROM PRODUCTS OF CONCEPTION, VIA NATURAL OR ARTIFICIAL OPENING: ICD-10-PCS | Performed by: OBSTETRICS & GYNECOLOGY

## 2024-02-26 PROCEDURE — 59515 CESAREAN DELIVERY: CPT | Performed by: OBSTETRICS & GYNECOLOGY

## 2024-02-26 PROCEDURE — 10H07YZ INSERTION OF OTHER DEVICE INTO PRODUCTS OF CONCEPTION, VIA NATURAL OR ARTIFICIAL OPENING: ICD-10-PCS | Performed by: OBSTETRICS & GYNECOLOGY

## 2024-02-26 RX ORDER — BUPIVACAINE HYDROCHLORIDE 2.5 MG/ML
INJECTION, SOLUTION EPIDURAL; INFILTRATION; INTRACAUDAL
Status: COMPLETED | OUTPATIENT
Start: 2024-02-26 | End: 2024-02-26

## 2024-02-26 RX ORDER — DIPHENHYDRAMINE HCL 25 MG
25 CAPSULE ORAL EVERY 4 HOURS PRN
Status: ACTIVE | OUTPATIENT
Start: 2024-02-26 | End: 2024-02-27

## 2024-02-26 RX ORDER — METOCLOPRAMIDE 10 MG/1
10 TABLET ORAL ONCE
Status: COMPLETED | OUTPATIENT
Start: 2024-02-26 | End: 2024-02-26

## 2024-02-26 RX ORDER — SODIUM CHLORIDE 9 MG/ML
INJECTION, SOLUTION INTRAVENOUS CONTINUOUS
OUTPATIENT
Start: 2024-02-27

## 2024-02-26 RX ORDER — METOCLOPRAMIDE HYDROCHLORIDE 5 MG/ML
10 INJECTION INTRAMUSCULAR; INTRAVENOUS ONCE
Status: COMPLETED | OUTPATIENT
Start: 2024-02-26 | End: 2024-02-26

## 2024-02-26 RX ORDER — METOCLOPRAMIDE HYDROCHLORIDE 5 MG/ML
INJECTION INTRAMUSCULAR; INTRAVENOUS
Status: DISPENSED
Start: 2024-02-26 | End: 2024-02-27

## 2024-02-26 RX ORDER — FAMOTIDINE 10 MG/ML
INJECTION, SOLUTION INTRAVENOUS
Status: DISPENSED
Start: 2024-02-26 | End: 2024-02-27

## 2024-02-26 RX ORDER — DEXAMETHASONE SODIUM PHOSPHATE 4 MG/ML
VIAL (ML) INJECTION AS NEEDED
Status: DISCONTINUED | OUTPATIENT
Start: 2024-02-26 | End: 2024-02-26 | Stop reason: SURG

## 2024-02-26 RX ORDER — HYDROMORPHONE HYDROCHLORIDE 1 MG/ML
0.6 INJECTION, SOLUTION INTRAMUSCULAR; INTRAVENOUS; SUBCUTANEOUS
Status: ACTIVE | OUTPATIENT
Start: 2024-02-26 | End: 2024-02-27

## 2024-02-26 RX ORDER — BUPIVACAINE HCL/0.9 % NACL/PF 0.25 %
5 PLASTIC BAG, INJECTION (ML) EPIDURAL AS NEEDED
Status: DISCONTINUED | OUTPATIENT
Start: 2024-02-26 | End: 2024-02-26

## 2024-02-26 RX ORDER — DIPHENHYDRAMINE HYDROCHLORIDE 50 MG/ML
12.5 INJECTION INTRAMUSCULAR; INTRAVENOUS EVERY 4 HOURS PRN
Status: ACTIVE | OUTPATIENT
Start: 2024-02-26 | End: 2024-02-27

## 2024-02-26 RX ORDER — LABETALOL HYDROCHLORIDE 5 MG/ML
80 INJECTION, SOLUTION INTRAVENOUS ONCE AS NEEDED
Status: DISCONTINUED | OUTPATIENT
Start: 2024-02-26 | End: 2024-02-29

## 2024-02-26 RX ORDER — HYDRALAZINE HYDROCHLORIDE 20 MG/ML
10 INJECTION INTRAMUSCULAR; INTRAVENOUS ONCE AS NEEDED
Status: DISCONTINUED | OUTPATIENT
Start: 2024-02-26 | End: 2024-02-29

## 2024-02-26 RX ORDER — CEFAZOLIN SODIUM/WATER 2 G/20 ML
2 SYRINGE (ML) INTRAVENOUS ONCE
Status: COMPLETED | OUTPATIENT
Start: 2024-02-26 | End: 2024-02-26

## 2024-02-26 RX ORDER — CEFAZOLIN SODIUM/WATER 2 G/20 ML
SYRINGE (ML) INTRAVENOUS
Status: DISPENSED
Start: 2024-02-26 | End: 2024-02-27

## 2024-02-26 RX ORDER — MISOPROSTOL 200 UG/1
TABLET ORAL
Status: DISPENSED
Start: 2024-02-26 | End: 2024-02-27

## 2024-02-26 RX ORDER — SODIUM CHLORIDE, SODIUM LACTATE, POTASSIUM CHLORIDE, CALCIUM CHLORIDE 600; 310; 30; 20 MG/100ML; MG/100ML; MG/100ML; MG/100ML
INJECTION, SOLUTION INTRAVENOUS CONTINUOUS
Status: DISCONTINUED | OUTPATIENT
Start: 2024-02-26 | End: 2024-02-29

## 2024-02-26 RX ORDER — MORPHINE SULFATE 2 MG/ML
2 INJECTION, SOLUTION INTRAMUSCULAR; INTRAVENOUS EVERY 10 MIN PRN
Status: DISCONTINUED | OUTPATIENT
Start: 2024-02-26 | End: 2024-02-27

## 2024-02-26 RX ORDER — TRANEXAMIC ACID 10 MG/ML
INJECTION, SOLUTION INTRAVENOUS
Status: DISPENSED
Start: 2024-02-26 | End: 2024-02-27

## 2024-02-26 RX ORDER — LABETALOL HYDROCHLORIDE 5 MG/ML
20 INJECTION, SOLUTION INTRAVENOUS ONCE AS NEEDED
Status: DISCONTINUED | OUTPATIENT
Start: 2024-02-26 | End: 2024-02-29

## 2024-02-26 RX ORDER — CALCIUM GLUCONATE 94 MG/ML
1 INJECTION, SOLUTION INTRAVENOUS ONCE AS NEEDED
Status: DISCONTINUED | OUTPATIENT
Start: 2024-02-26 | End: 2024-02-29

## 2024-02-26 RX ORDER — NALOXONE HYDROCHLORIDE 0.4 MG/ML
0.08 INJECTION, SOLUTION INTRAMUSCULAR; INTRAVENOUS; SUBCUTANEOUS
OUTPATIENT
Start: 2024-02-26

## 2024-02-26 RX ORDER — TRANEXAMIC ACID 10 MG/ML
INJECTION, SOLUTION INTRAVENOUS AS NEEDED
Status: DISCONTINUED | OUTPATIENT
Start: 2024-02-26 | End: 2024-02-26 | Stop reason: SURG

## 2024-02-26 RX ORDER — NALBUPHINE HYDROCHLORIDE 10 MG/ML
2.5 INJECTION, SOLUTION INTRAMUSCULAR; INTRAVENOUS; SUBCUTANEOUS
Status: DISCONTINUED | OUTPATIENT
Start: 2024-02-26 | End: 2024-02-26

## 2024-02-26 RX ORDER — HYDROCODONE BITARTRATE AND ACETAMINOPHEN 7.5; 325 MG/1; MG/1
2 TABLET ORAL EVERY 6 HOURS PRN
Status: ACTIVE | OUTPATIENT
Start: 2024-02-26 | End: 2024-02-27

## 2024-02-26 RX ORDER — HYDROMORPHONE HYDROCHLORIDE 1 MG/ML
0.4 INJECTION, SOLUTION INTRAMUSCULAR; INTRAVENOUS; SUBCUTANEOUS EVERY 5 MIN PRN
Status: DISCONTINUED | OUTPATIENT
Start: 2024-02-26 | End: 2024-02-27

## 2024-02-26 RX ORDER — DIPHENHYDRAMINE HYDROCHLORIDE 50 MG/ML
12.5 INJECTION INTRAMUSCULAR; INTRAVENOUS EVERY 4 HOURS PRN
OUTPATIENT
Start: 2024-02-26

## 2024-02-26 RX ORDER — NALBUPHINE HYDROCHLORIDE 10 MG/ML
2.5 INJECTION, SOLUTION INTRAMUSCULAR; INTRAVENOUS; SUBCUTANEOUS EVERY 4 HOURS PRN
Status: ACTIVE | OUTPATIENT
Start: 2024-02-26 | End: 2024-02-27

## 2024-02-26 RX ORDER — ACETAMINOPHEN 325 MG/1
650 TABLET ORAL EVERY 6 HOURS PRN
Status: DISPENSED | OUTPATIENT
Start: 2024-02-26 | End: 2024-02-27

## 2024-02-26 RX ORDER — FAMOTIDINE 10 MG/ML
20 INJECTION, SOLUTION INTRAVENOUS ONCE
Status: COMPLETED | OUTPATIENT
Start: 2024-02-26 | End: 2024-02-26

## 2024-02-26 RX ORDER — HYDROMORPHONE HYDROCHLORIDE 1 MG/ML
0.6 INJECTION, SOLUTION INTRAMUSCULAR; INTRAVENOUS; SUBCUTANEOUS EVERY 5 MIN PRN
Status: DISCONTINUED | OUTPATIENT
Start: 2024-02-26 | End: 2024-02-27

## 2024-02-26 RX ORDER — HYDROMORPHONE HYDROCHLORIDE 1 MG/ML
0.2 INJECTION, SOLUTION INTRAMUSCULAR; INTRAVENOUS; SUBCUTANEOUS EVERY 5 MIN PRN
Status: DISCONTINUED | OUTPATIENT
Start: 2024-02-26 | End: 2024-02-27

## 2024-02-26 RX ORDER — ONDANSETRON 2 MG/ML
INJECTION INTRAMUSCULAR; INTRAVENOUS AS NEEDED
Status: DISCONTINUED | OUTPATIENT
Start: 2024-02-26 | End: 2024-02-26 | Stop reason: SURG

## 2024-02-26 RX ORDER — MORPHINE SULFATE 10 MG/ML
6 INJECTION, SOLUTION INTRAMUSCULAR; INTRAVENOUS EVERY 10 MIN PRN
Status: DISCONTINUED | OUTPATIENT
Start: 2024-02-26 | End: 2024-02-27

## 2024-02-26 RX ORDER — HYDROCODONE BITARTRATE AND ACETAMINOPHEN 7.5; 325 MG/1; MG/1
1 TABLET ORAL EVERY 6 HOURS PRN
Status: DISPENSED | OUTPATIENT
Start: 2024-02-26 | End: 2024-02-27

## 2024-02-26 RX ORDER — PROCHLORPERAZINE EDISYLATE 5 MG/ML
5 INJECTION INTRAMUSCULAR; INTRAVENOUS ONCE AS NEEDED
Status: ACTIVE | OUTPATIENT
Start: 2024-02-26 | End: 2024-02-26

## 2024-02-26 RX ORDER — CARBOPROST TROMETHAMINE 250 UG/ML
INJECTION, SOLUTION INTRAMUSCULAR AS NEEDED
Status: DISCONTINUED | OUTPATIENT
Start: 2024-02-26 | End: 2024-02-26 | Stop reason: SURG

## 2024-02-26 RX ORDER — METHYLERGONOVINE MALEATE 0.2 MG/ML
INJECTION INTRAVENOUS
Status: DISPENSED
Start: 2024-02-26 | End: 2024-02-27

## 2024-02-26 RX ORDER — LIDOCAINE HYDROCHLORIDE AND EPINEPHRINE 15; 5 MG/ML; UG/ML
INJECTION, SOLUTION EPIDURAL
Status: COMPLETED | OUTPATIENT
Start: 2024-02-26 | End: 2024-02-26

## 2024-02-26 RX ORDER — HALOPERIDOL 5 MG/ML
0.5 INJECTION INTRAMUSCULAR ONCE AS NEEDED
Status: ACTIVE | OUTPATIENT
Start: 2024-02-26 | End: 2024-02-26

## 2024-02-26 RX ORDER — FAMOTIDINE 20 MG/1
20 TABLET, FILM COATED ORAL ONCE
Status: COMPLETED | OUTPATIENT
Start: 2024-02-26 | End: 2024-02-26

## 2024-02-26 RX ORDER — LIDOCAINE HCL/EPINEPHRINE/PF 2%-1:200K
VIAL (ML) INJECTION AS NEEDED
Status: DISCONTINUED | OUTPATIENT
Start: 2024-02-26 | End: 2024-02-26 | Stop reason: SURG

## 2024-02-26 RX ORDER — BUPIVACAINE HYDROCHLORIDE 2.5 MG/ML
20 INJECTION, SOLUTION EPIDURAL; INFILTRATION; INTRACAUDAL ONCE
Status: COMPLETED | OUTPATIENT
Start: 2024-02-26 | End: 2024-02-26

## 2024-02-26 RX ORDER — LIDOCAINE HYDROCHLORIDE 10 MG/ML
INJECTION, SOLUTION INFILTRATION; PERINEURAL
Status: COMPLETED | OUTPATIENT
Start: 2024-02-26 | End: 2024-02-26

## 2024-02-26 RX ORDER — NALOXONE HYDROCHLORIDE 0.4 MG/ML
0.08 INJECTION, SOLUTION INTRAMUSCULAR; INTRAVENOUS; SUBCUTANEOUS
Status: ACTIVE | OUTPATIENT
Start: 2024-02-26 | End: 2024-02-27

## 2024-02-26 RX ORDER — NALOXONE HYDROCHLORIDE 0.4 MG/ML
0.08 INJECTION, SOLUTION INTRAMUSCULAR; INTRAVENOUS; SUBCUTANEOUS AS NEEDED
Status: ACTIVE | OUTPATIENT
Start: 2024-02-26 | End: 2024-02-27

## 2024-02-26 RX ORDER — ONDANSETRON 2 MG/ML
4 INJECTION INTRAMUSCULAR; INTRAVENOUS ONCE AS NEEDED
Status: ACTIVE | OUTPATIENT
Start: 2024-02-26 | End: 2024-02-26

## 2024-02-26 RX ORDER — SODIUM CHLORIDE 9 MG/ML
INJECTION, SOLUTION INTRAVENOUS CONTINUOUS PRN
Status: DISCONTINUED | OUTPATIENT
Start: 2024-02-26 | End: 2024-02-26 | Stop reason: SURG

## 2024-02-26 RX ORDER — CARBOPROST TROMETHAMINE 250 UG/ML
INJECTION, SOLUTION INTRAMUSCULAR
Status: DISPENSED
Start: 2024-02-26 | End: 2024-02-27

## 2024-02-26 RX ORDER — MORPHINE SULFATE 4 MG/ML
4 INJECTION, SOLUTION INTRAMUSCULAR; INTRAVENOUS EVERY 10 MIN PRN
Status: DISCONTINUED | OUTPATIENT
Start: 2024-02-26 | End: 2024-02-27

## 2024-02-26 RX ORDER — HYDROMORPHONE HYDROCHLORIDE 1 MG/ML
0.4 INJECTION, SOLUTION INTRAMUSCULAR; INTRAVENOUS; SUBCUTANEOUS
Status: ACTIVE | OUTPATIENT
Start: 2024-02-26 | End: 2024-02-27

## 2024-02-26 RX ORDER — LABETALOL HYDROCHLORIDE 5 MG/ML
40 INJECTION, SOLUTION INTRAVENOUS ONCE AS NEEDED
Status: DISCONTINUED | OUTPATIENT
Start: 2024-02-26 | End: 2024-02-29

## 2024-02-26 RX ADMIN — LIDOCAINE HYDROCHLORIDE 5 ML: 10 INJECTION, SOLUTION INFILTRATION; PERINEURAL at 15:21:00

## 2024-02-26 RX ADMIN — TRANEXAMIC ACID 1000 MG: 10 INJECTION, SOLUTION INTRAVENOUS at 22:30:00

## 2024-02-26 RX ADMIN — LIDOCAINE HCL/EPINEPHRINE/PF 10 ML: 2%-1:200K VIAL (ML) INJECTION at 22:10:00

## 2024-02-26 RX ADMIN — CARBOPROST TROMETHAMINE 250 MCG: 250 INJECTION, SOLUTION INTRAMUSCULAR at 22:32:00

## 2024-02-26 RX ADMIN — LIDOCAINE HCL/EPINEPHRINE/PF 10 ML: 2%-1:200K VIAL (ML) INJECTION at 21:55:00

## 2024-02-26 RX ADMIN — LIDOCAINE HCL/EPINEPHRINE/PF 5 ML: 2%-1:200K VIAL (ML) INJECTION at 22:02:00

## 2024-02-26 RX ADMIN — CEFAZOLIN SODIUM/WATER 2 G: 2 G/20 ML SYRINGE (ML) INTRAVENOUS at 21:55:00

## 2024-02-26 RX ADMIN — DEXAMETHASONE SODIUM PHOSPHATE 4 MG: 4 MG/ML VIAL (ML) INJECTION at 22:40:00

## 2024-02-26 RX ADMIN — LIDOCAINE HYDROCHLORIDE AND EPINEPHRINE 3 ML: 15; 5 INJECTION, SOLUTION EPIDURAL at 15:40:00

## 2024-02-26 RX ADMIN — ONDANSETRON 4 MG: 2 INJECTION INTRAMUSCULAR; INTRAVENOUS at 22:40:00

## 2024-02-26 RX ADMIN — SODIUM CHLORIDE: 9 INJECTION, SOLUTION INTRAVENOUS at 21:55:00

## 2024-02-26 RX ADMIN — BUPIVACAINE HYDROCHLORIDE 3 ML: 2.5 INJECTION, SOLUTION EPIDURAL; INFILTRATION; INTRACAUDAL at 15:40:00

## 2024-02-26 NOTE — PROGRESS NOTES
[facility]    Labor Progress Note    Rossy Johnson Patient Status:  Inpatient    1990 MRN [id number]   Location [facility] Attending Srinivasa Singleton DO   Hosp Day # 2 PCP No primary care provider on file.       Subjective   Interval History:   IOL for mild pre-eclampsia. Comfortable post epidural after AROM.     Objective   Vital signs in last 24 hours:  Temp:  [98.4 °F (36.9 °C)-98.5 °F (36.9 °C)] 98.5 °F (36.9 °C)  Pulse:  [66-84] 68  Resp:  [16-17] 17  BP: (101-165)/(59-99) 101/83  SpO2:  [99 %-100 %] 100 %    Input/Output:    Intake/Output Summary (Last 24 hours) at 2024 1714  Last data filed at 2024 1400  Gross per 24 hour   Intake 3427.37 ml   Output 890 ml   Net 2537.37 ml       FHT assessment:   Baseline: 140 bpm   Variability: moderate   Accels:  present   Decels: No   Tocos:  contractions every 3-5 minute with some couplets and triplets   Category: 1 tracing    Sterile vaginal exam:  Dilation: 5 cm dilation   Effacement: 90  Station: -1   Position: Cephalic and IUPC discussed / placed    Results:         Assessment/Plan     Mild pre-eclampsia in third trimester (HCC)        Pregnancy (HCC)    PLAN: Titrate pitocin for adequate labor. Anticipate . Again discussed Guillaume for delivery with meconium.         Plan discussed with patient who verbalizes understanding and agreement.    Srinivasa Singleton DO  2024

## 2024-02-26 NOTE — ANESTHESIA PREPROCEDURE EVALUATION
Anesthesia PreOp Note    HPI:     Rossy Johnson is a 33 year old female who presents for preoperative consultation requested by: * No surgeons listed *    Date of Surgery: 2/26/2024    * No procedures listed *  Indication: * No pre-op diagnosis entered *    Relevant Problems   No relevant active problems       NPO:                         History Review:  Patient Active Problem List    Diagnosis Date Noted    Mild pre-eclampsia in third trimester ([facility]) 02/26/2024    Pregnancy ([facility]) 02/24/2024    Itching of both hands 12/14/2023    Anemia affecting pregnancy ([facility]) 12/12/2023    Lupus anticoagulant positive 07/12/2023    History of uterine fibroid 07/12/2023       Past Medical History:   Diagnosis Date    Decorative tattoo     Fibroids     History of anemia     RA (rheumatoid arthritis) ([facility])        Past Surgical History:   Procedure Laterality Date    IMPLANTABLE BREAST PROSTHESIS         Medications Prior to Admission   Medication Sig Dispense Refill Last Dose    nystatin-triamcinolone 100,000-0.1 Units/g-% External Ointment Apply 1 Application topically 2 (two) times daily. 15 g 0 2/23/2024    aspirin 81 MG Oral Tab EC Take 1 tablet (81 mg total) by mouth daily.   2/23/2024    Ferrous Sulfate (SLOW FE OR) Take 85 mg by mouth. Pt unsure of brand and dosage   2/23/2024    prenatal vitamin with DHA 27-0.8-228 MG Oral Cap Take 1 capsule by mouth daily.        Current Facility-Administered Medications Ordered in Epic   Medication Dose Route Frequency Provider Last Rate Last Admin    fentaNYL-bupivacaine 2 mcg/mL-0.125% in sodium chloride 0.9% 100 mL EPIDURAL infusion premix   Epidural Continuous Fish Owens MD        fentaNYL (Sublimaze) 50 mcg/mL injection 100 mcg  100 mcg Epidural Once Fish Owens MD        bupivacaine PF (Marcaine) 0.25% injection  20 mL Epidural Once Fish Owens MD        EPHEDrine (PF) 25 MG/5 ML injection 5 mg  5 mg Intravenous PRN Fish Owens MD        nalbuphine  (Nubain) 10 mg/mL injection 2.5 mg  2.5 mg Intravenous Q15 Min PRN Fish Owens MD        fentaNYL (Sublimaze) 50 mcg/mL injection 100 mcg  100 mcg Intravenous Once Rosalva Sahu MD        fentaNYL (Sublimaze) 50 mcg/mL injection 50 mcg  50 mcg Intravenous Q30 Min PRN Rosalva Sahu MD        oxyTOCIN in sodium chloride 0.9% (Pitocin) 30 Units/500mL infusion premix  0.5-20 rose-units/min Intravenous Continuous Rosalva Sahu MD 2 mL/hr at 02/26/24 1300 2 rose-units/min at 02/26/24 1300    lactated ringers infusion   Intravenous Continuous Rosalva Sahu MD        dextrose in lactated ringers 5% infusion   Intravenous Continuous Rosalva Sahu MD        lactated ringers infusion   Intravenous PRN Rosalva Sahu  mL/hr at 02/25/24 1211 New Bag at 02/25/24 1211    lactated ringers IV bolus 500 mL  500 mL Intravenous PRN Rosalva Sahu MD        acetaminophen (Tylenol Extra Strength) tab 500 mg  500 mg Oral Q6H PRN Rosalva Sahu MD        acetaminophen (Tylenol Extra Strength) tab 1,000 mg  1,000 mg Oral Q6H PRN Rosalva Sahu MD        ibuprofen (Motrin) tab 600 mg  600 mg Oral Once PRN Rosalva Sahu MD        ondansetron (Zofran) 4 MG/2ML injection 4 mg  4 mg Intravenous Q6H PRN Rosalva Sahu MD        oxyTOCIN in sodium chloride 0.9% (Pitocin) 30 Units/500mL infusion premix  62.5-900 rose-units/min Intravenous Continuous Rosalva Sahu MD        terbutaline (Brethine) 1 MG/ML injection 0.25 mg  0.25 mg Subcutaneous PRN Rosalva Sahu MD        sodium citrate-citric acid (Bicitra) 500-334 MG/5ML oral solution 30 mL  30 mL Oral PRN Rosalva Sahu MD        lidocaine PF (Xylocaine-MPF) 1% injection  30 mL Intradermal Once Rosalva Sahu MD        nalbuphine (Nubain) 10 mg/mL injection 10 mg  10 mg Intramuscular Q6H PRN Rosalva Sahu MD   10 mg at 02/26/24 0424    And    hydrOXYzine 50 mg/mL injection 50 mg  50 mg Intramuscular Q6H PRN Rosalva Sahu MD   50 mg at  02/26/24 0424     No current [facility]-ordered outpatient medications on file.       No Known Allergies    Family History   Problem Relation Age of Onset    Hypertension Mother     Other (lupus) Mother     Breast Cancer Maternal Grandmother         mastectomy    Other (liver cancer) Maternal Grandfather     Diabetes Paternal Grandfather      Social History     Socioeconomic History    Marital status:    Tobacco Use    Smoking status: Never    Smokeless tobacco: Never   Substance and Sexual Activity    Alcohol use: Not Currently     Comment: 1-2 glasses per month prior to pregnancy    Drug use: Never       Available pre-op labs reviewed.  Lab Results   Component Value Date    WBC 6.2 02/26/2024    RBC 3.77 (L) 02/26/2024    HGB 11.7 (L) 02/26/2024    HCT 40.5 02/26/2024    .4 (H) 02/26/2024    MCH 31.0 02/26/2024    MCHC 28.9 (L) 02/26/2024    RDW 15.9 (H) 02/26/2024    .0 (L) 02/26/2024     Lab Results   Component Value Date     02/24/2024    K 4.0 02/21/2024     02/24/2024    CO2 19.0 (L) 02/24/2024    BUN 5 (L) 02/21/2024    CREATSERUM 0.52 (L) 02/24/2024     (H) 02/24/2024    CA 9.2 02/24/2024          Vital Signs:  Body mass index is 29.69 kg/m².   height is 1.524 m (5') and weight is 68.9 kg (152 lb). Her oral temperature is 98.5 °F (36.9 °C). Her blood pressure is 145/86 and her pulse is 78. Her respiration is 16.   Vitals:    02/26/24 0830 02/26/24 0900 02/26/24 0930 02/26/24 1200   BP: 127/72 115/65 139/78 145/86   Pulse: 69 66 67 78   Resp:       Temp:       TempSrc:       Weight:       Height:            Anesthesia Evaluation      Airway   Mallampati: II  TM distance: >3 FB  Dental      Pulmonary - negative ROS and normal exam   Cardiovascular - normal exam  (+) hypertension    Neuro/Psych - negative ROS     GI/Hepatic/Renal      Endo/Other    (+) arthritis  Abdominal  - normal exam                 Anesthesia Plan:   ASA:  2  Plan:   Epidural  Plan Comments: Neuroaxial  anesthesia was explained in details to the patient, addressing the technique, intended outcome and known adverse events namely spinal or epidural bleeding, infection, post dural puncture headaches, complete spinal block with resulting cardiovascular and respiratory failure, block failure and or need for multiple attempts or switching to general anesthesia.     PRE ECLAMPSIA WITH DECREASED PLATELET COUNT, CURRENTLY .     WILL NEED REPEAT PLATELET COUNT BEFORE PULLING OUT THE CATHETER.       I have informed Rossy Johnson and/or legal guardian or family member of the nature of the anesthetic plan, benefits, risks including possible dental damage if relevant, major complications, and any alternative forms of anesthetic management.   All of the patient's questions were answered to the best of my ability. The patient desires the anesthetic management as planned.  Fish Owens MD  2/26/2024 3:19 PM  Present on Admission:   Pregnancy (HCC)   Mild pre-eclampsia in third trimester (HCC)

## 2024-02-26 NOTE — PROGRESS NOTES
[facility]    Labor Progress Note    Rossy Johnson Patient Status:  Inpatient    1990 MRN [id number]   Location [facility] Attending Srinivasa Singleton DO   Hosp Day # 2 PCP No primary care provider on file.       Subjective   Interval History:   IOL for mild pre-eclampsia. Cytotec and labored. Moderate UC's with pitocin = 8 mu/ min.     Objective   Vital signs in last 24 hours:  Temp:  [98.4 °F (36.9 °C)-98.5 °F (36.9 °C)] 98.5 °F (36.9 °C)  Pulse:  [66-76] 67  Resp:  [16] 16  BP: (111-139)/(59-80) 139/78    Input/Output:    Intake/Output Summary (Last 24 hours) at 2024 1211  Last data filed at 2024 0800  Gross per 24 hour   Intake --   Output 770 ml   Net -770 ml       FHT assessment:   Baseline: 140 bpm   Variability: moderate   Accels:  present   Decels: No   Tocos:  contractions every 3 minute   Category: 1 tracing    Sterile vaginal exam:  Dilation: 5 cm dilation   Effacement: 90  Station: -1   Position: Cephalic, AROM- thick meconium stained fluid.     Results:     O positive with negative Ab screen  H/H = 12.2g / 35% and platelets = 200K  P/C ratio = 0.51 and normal CMP      Assessment/Plan     Mild pre-eclampsia in third trimester (HCC)        Pregnancy (HCC)    PLAN: Discussed analgesia and she requests epidural. Continue pitocin. Anticipate .         Plan discussed with patient who verbalizes understanding and agreement.    Srinivasa Singleton DO  2024

## 2024-02-26 NOTE — PROCEDURES
COOK'S CATHETER PLACEMENT      Consent obtained for catheter placement and risks/benefits explained.  All questions answered.    Pt placed in dorsolithomy position and speculum placed. Betadine prep of cervix.   Under sterile technique, cook's Catheter placed in os and stylus removed.  60cc of normal saline used to fill uterine balloon and catheter was checked for proper placement and good placement inside the os was noted. Speculum was removed.   60cc was then used to fill the vaginal balloon.  Pt tolerated procedure well.

## 2024-02-26 NOTE — ANESTHESIA PROCEDURE NOTES
Labor Analgesia    Date/Time: 2/26/2024 3:21 PM    Performed by: Fish Owens MD  Authorized by: Fish Owens MD      General Information and Staff    Start Time:  2/26/2024 3:21 PM  End Time:  2/26/2024 3:40 PM  Anesthesiologist:  Fish Owens MD  Performed by:  Anesthesiologist  Patient Location:  OB  Reason for Block: labor epidural    Preanesthetic Checklist: patient identified, IV checked, site marked, risks and benefits discussed, monitors and equipment checked, pre-op evaluation, timeout performed, anesthesia consent and sterile technique used      Procedure Details    Patient Position:  Sitting  Prep: ChloraPrep    Monitoring:  Heart rate  Approach:  Midline    Epidural Needle    Injection Technique:  CINDY air  Needle Type:  Tuohy  Needle Gauge:  18 G  Needle Length:  3.5 in  Needle Insertion Depth:  4.5  Location:  L2-3    Spinal Needle      Catheter    Catheter Type:  Multi-orifice  Catheter Size:  20 G  Catheter at Skin Depth:  9  Test Dose:  Negative    Assessment      Additional Comments

## 2024-02-27 LAB
ALBUMIN SERPL-MCNC: 2.8 G/DL (ref 3.2–4.8)
ALBUMIN/GLOB SERPL: 1.2 {RATIO} (ref 1–2)
ALP LIVER SERPL-CCNC: 155 U/L
ALT SERPL-CCNC: 10 U/L
ANION GAP SERPL CALC-SCNC: 8 MMOL/L (ref 0–18)
AST SERPL-CCNC: 46 U/L (ref ?–34)
BASOPHILS # BLD AUTO: 0.02 X10(3) UL (ref 0–0.2)
BASOPHILS NFR BLD AUTO: 0.1 %
BILIRUB SERPL-MCNC: 0.5 MG/DL (ref 0.3–1.2)
BUN BLD-MCNC: <5 MG/DL (ref 9–23)
CALCIUM BLD-MCNC: 8.4 MG/DL (ref 8.7–10.4)
CHLORIDE SERPL-SCNC: 111 MMOL/L (ref 98–112)
CO2 SERPL-SCNC: 23 MMOL/L (ref 21–32)
CREAT BLD-MCNC: 0.5 MG/DL
DEPRECATED RDW RBC AUTO: 43.8 FL (ref 35.1–46.3)
DEPRECATED RDW RBC AUTO: 44.9 FL (ref 35.1–46.3)
EGFRCR SERPLBLD CKD-EPI 2021: 128 ML/MIN/1.73M2 (ref 60–?)
EOSINOPHIL # BLD AUTO: 0 X10(3) UL (ref 0–0.7)
EOSINOPHIL NFR BLD AUTO: 0 %
ERYTHROCYTE [DISTWIDTH] IN BLOOD BY AUTOMATED COUNT: 13.4 % (ref 11–15)
ERYTHROCYTE [DISTWIDTH] IN BLOOD BY AUTOMATED COUNT: 13.6 % (ref 11–15)
GLOBULIN PLAS-MCNC: 2.3 G/DL (ref 2.8–4.4)
GLUCOSE BLD-MCNC: 89 MG/DL (ref 70–99)
HCT VFR BLD AUTO: 18.8 %
HCT VFR BLD AUTO: 26.9 %
HGB BLD-MCNC: 6.4 G/DL
HGB BLD-MCNC: 9.3 G/DL
IMM GRANULOCYTES # BLD AUTO: 0.07 X10(3) UL (ref 0–1)
IMM GRANULOCYTES NFR BLD: 0.4 %
LYMPHOCYTES # BLD AUTO: 1.78 X10(3) UL (ref 1–4)
LYMPHOCYTES NFR BLD AUTO: 11.4 %
MAGNESIUM SERPL-MCNC: 4.3 MG/DL (ref 4.8–8.4)
MAGNESIUM SERPL-MCNC: 5.5 MG/DL (ref 4.8–8.4)
MAGNESIUM SERPL-MCNC: 6.1 MG/DL (ref 4.8–8.4)
MAGNESIUM SERPL-MCNC: 6.6 MG/DL (ref 4.8–8.4)
MCH RBC QN AUTO: 30.8 PG (ref 26–34)
MCH RBC QN AUTO: 31.2 PG (ref 26–34)
MCHC RBC AUTO-ENTMCNC: 34 G/DL (ref 31–37)
MCHC RBC AUTO-ENTMCNC: 34.6 G/DL (ref 31–37)
MCV RBC AUTO: 90.3 FL
MCV RBC AUTO: 90.4 FL
MONOCYTES # BLD AUTO: 0.86 X10(3) UL (ref 0.1–1)
MONOCYTES NFR BLD AUTO: 5.5 %
NEUTROPHILS # BLD AUTO: 12.95 X10 (3) UL (ref 1.5–7.7)
NEUTROPHILS # BLD AUTO: 12.95 X10(3) UL (ref 1.5–7.7)
NEUTROPHILS NFR BLD AUTO: 82.6 %
PLATELET # BLD AUTO: 158 10(3)UL (ref 150–450)
PLATELET # BLD AUTO: 168 10(3)UL (ref 150–450)
POTASSIUM SERPL-SCNC: 3.6 MMOL/L (ref 3.5–5.1)
PROT SERPL-MCNC: 5.1 G/DL (ref 5.7–8.2)
RBC # BLD AUTO: 2.08 X10(6)UL
RBC # BLD AUTO: 2.98 X10(6)UL
SODIUM SERPL-SCNC: 142 MMOL/L (ref 136–145)
WBC # BLD AUTO: 15.7 X10(3) UL (ref 4–11)
WBC # BLD AUTO: 17.9 X10(3) UL (ref 4–11)

## 2024-02-27 PROCEDURE — 30233N1 TRANSFUSION OF NONAUTOLOGOUS RED BLOOD CELLS INTO PERIPHERAL VEIN, PERCUTANEOUS APPROACH: ICD-10-PCS | Performed by: OBSTETRICS & GYNECOLOGY

## 2024-02-27 RX ORDER — KETOROLAC TROMETHAMINE 30 MG/ML
30 INJECTION, SOLUTION INTRAMUSCULAR; INTRAVENOUS EVERY 6 HOURS
Status: COMPLETED | OUTPATIENT
Start: 2024-02-27 | End: 2024-02-27

## 2024-02-27 RX ORDER — LOPERAMIDE HYDROCHLORIDE 2 MG/1
2 CAPSULE ORAL 4 TIMES DAILY PRN
Status: DISCONTINUED | OUTPATIENT
Start: 2024-02-27 | End: 2024-02-29

## 2024-02-27 RX ORDER — ACETAMINOPHEN 500 MG
1000 TABLET ORAL EVERY 6 HOURS
Status: DISCONTINUED | OUTPATIENT
Start: 2024-02-27 | End: 2024-02-29

## 2024-02-27 RX ORDER — SODIUM CHLORIDE 9 MG/ML
INJECTION, SOLUTION INTRAVENOUS ONCE
Status: COMPLETED | OUTPATIENT
Start: 2024-02-27 | End: 2024-02-27

## 2024-02-27 RX ORDER — SIMETHICONE 80 MG
80 TABLET,CHEWABLE ORAL 3 TIMES DAILY PRN
Status: DISCONTINUED | OUTPATIENT
Start: 2024-02-27 | End: 2024-02-29

## 2024-02-27 RX ORDER — DEXTROSE, SODIUM CHLORIDE, SODIUM LACTATE, POTASSIUM CHLORIDE, AND CALCIUM CHLORIDE 5; .6; .31; .03; .02 G/100ML; G/100ML; G/100ML; G/100ML; G/100ML
INJECTION, SOLUTION INTRAVENOUS CONTINUOUS
Status: DISCONTINUED | OUTPATIENT
Start: 2024-02-27 | End: 2024-02-29

## 2024-02-27 RX ORDER — BISACODYL 10 MG
10 SUPPOSITORY, RECTAL RECTAL ONCE AS NEEDED
Status: DISCONTINUED | OUTPATIENT
Start: 2024-02-27 | End: 2024-02-29

## 2024-02-27 RX ORDER — CHOLECALCIFEROL (VITAMIN D3) 25 MCG
1 TABLET,CHEWABLE ORAL DAILY
Status: DISCONTINUED | OUTPATIENT
Start: 2024-02-27 | End: 2024-02-29

## 2024-02-27 RX ORDER — ONDANSETRON 2 MG/ML
4 INJECTION INTRAMUSCULAR; INTRAVENOUS EVERY 6 HOURS PRN
Status: DISCONTINUED | OUTPATIENT
Start: 2024-02-27 | End: 2024-02-29

## 2024-02-27 RX ORDER — DOCUSATE SODIUM 100 MG/1
100 CAPSULE, LIQUID FILLED ORAL
Status: DISCONTINUED | OUTPATIENT
Start: 2024-02-27 | End: 2024-02-29

## 2024-02-27 RX ORDER — IBUPROFEN 600 MG/1
600 TABLET ORAL EVERY 6 HOURS
Status: DISCONTINUED | OUTPATIENT
Start: 2024-02-27 | End: 2024-02-29

## 2024-02-27 RX ORDER — GABAPENTIN 300 MG/1
300 CAPSULE ORAL EVERY 8 HOURS PRN
Status: DISCONTINUED | OUTPATIENT
Start: 2024-02-27 | End: 2024-02-29

## 2024-02-27 RX ORDER — AMMONIA INHALANTS 0.04 G/.3ML
0.3 INHALANT RESPIRATORY (INHALATION) AS NEEDED
Status: DISCONTINUED | OUTPATIENT
Start: 2024-02-27 | End: 2024-02-29

## 2024-02-27 NOTE — LACTATION NOTE
LACTATION NOTE - MOTHER      Evaluation Type: Inpatient    Problems identified  Problems identified: Knowledge deficit;Previous breast surgery  Previous breast surgery: Augmentation  Problems Identified Other: patient is currently on mag, and had low H&H and just received 2 units of PRBCs    Maternal history  Maternal history: Induction of labor;PPH;Anemia    Breastfeeding goal  Breastfeeding goal: To maintain breast milk feeding per patient goal    Maternal Assessment  Bilateral Breasts: Filling;Symmetrical  Bilateral Nipples: Slightly everted/short;Colostrum easily expressed  Prior breastfeeding experience (comment below): Primip  Breastfeeding Assistance: Breastfeeding assistance provided with permission;Hand expression provided with permission;Breast exam provided with permission    Pain assessment  Location/Comment: denies                    Assisted patient with a breast feeding session. Patients goal is to breast. She had a C/S, is currently on Mag, and had PPH, and just finished blood transfusion of 2 units. Patient so far has attempted a little with breast feeding, but has not felt well enough the last few hrs to breast fed, and the baby has been supplemented with formula. Discussed with mom that her health is most important, and if she's not up to breast feeding right now, can try again later tonight or tomorrow. Discussed pumping, and left hand pump at the bedside, but told to only do it if she feels up to it. LC assisted with trying to breast fed. Patient has IV's infusing on both arms, and is not able to sit up enough to be positioned great for breast feeding. LC held baby on mom's chest and was able to have a few sucks & HE from both sides. Was a very uncoordinated feeding, mom states she had a little success with the nipple shield earlier, this fed was without, and encouraged that when feeling up to it, may benefit from the shield. And even if she does not feel up to breast feeding, she can do breast  massage & HE to help with stimulation for her milk supply. Encouraged to call for breast feeding assistance tomorrow. RN at the bedside at end of feeding, and relayed all this information to her.

## 2024-02-27 NOTE — LACTATION NOTE
This note was copied from a baby's chart.  LACTATION NOTE - INFANT    Evaluation Type  Evaluation Type: Inpatient    Problems & Assessment  Problems Diagnosed or Identified: Latch difficulty  Infant Assessment: Minimal hunger cues present  Muscle tone: Appropriate for GA    Feeding Assessment  Summary Current Feeding: Breastfeeding with formula supplement;Breastfeeding using a nipple shield  Breastfeeding Assessment: Assisted with breastfeeding w/mother's permission;Needs pacing;Pulling on nipple;Calm and ready to breastfeed  Breastfeeding Positions: cross cradle;football;cradle;laid back;right breast;left breast  Latch: Repeated attempts, hold nipple in mouth, stimulate to suck  Audible Sucks/Swallows: A few with stimulation  Type of Nipple: Everted (after stimulation)  Comfort (Breast/Nipple): Soft/non-tender  Hold (Positioning): Full assist, staff holds infant at breast  LATCH Score: 6

## 2024-02-27 NOTE — PROGRESS NOTES
[facility]    Labor Progress Note    Rossy Johnson Patient Status:  Inpatient    1990 MRN [id number]   Location [facility] Attending Srinivasa Singleton, DO   Hosp Day # 2 PCP No primary care provider on file.       Subjective   Interval History:   IOL for mild Pre-E. Cytotec, pitocin, AROM, epidural. Augmentation and IUPC.     Objective   Vital signs in last 24 hours:  Temp:  [98.4 °F (36.9 °C)-99.3 °F (37.4 °C)] 98.9 °F (37.2 °C)  Pulse:  [64-87] 87  Resp:  [16-17] 17  BP: (101-165)/(56-99) 146/75  SpO2:  [97 %-100 %] 100 %    Input/Output:    Intake/Output Summary (Last 24 hours) at 2024 2110  Last data filed at 2024 1915  Gross per 24 hour   Intake 3933.62 ml   Output 1320 ml   Net 2613.62 ml       FHT assessment:   Baseline: 145 bpm   Variability: moderate   Accels:  present   Decels: No   Tocos:  contractions every 2-3 minute   Category: 1 tracing    Sterile vaginal exam:  Dilation: 5 cm dilation   Effacement: 80%  Station: -1   Position: Cephalic    Results:         Assessment/Plan     Failure to progress in labor (HCC)        Pregnancy (HCC)        Mild pre-eclampsia in third trimester (HCC)    PLAN: She has essentially no progress since this AM despite AROM and pitocin. The pitocin had been discontinued for short time earlier today and restarted. IUPC placed at 1700. I re-examined and discussed concerns for FTP. After discussion, she elected to give 2 hours further on pitocin and no change. I recommended primary  section. The procedure with it's indications, risks and complications was discussed. These include but are not limited to: bleeding, infection, injury and VTE. Any of these could require further medical and / or surgical therapy. We discussed prophylactic measures as well. Questions answered and consent obtained. Royal present for earlier and current discussion. There is a current  beginning now. Pitocin turned off  and we'll proceed as soon as possible.       Plan discussed with patient who verbalizes understanding and agreement.    rSinivasa Singleton, DO  2/26/2024

## 2024-02-27 NOTE — PROGRESS NOTES
This RN received a call from blood bank at 1246 regarding hemoglobin of 6.4. This RN and MARGUERITE Webb notified Dr. Johnson who ordered 2 units packed red blood cells to be transfused. First unit started at 1335. Patient tolerated well, encouraged to call with itchiness, anxiety, nausea, and other signs of transfusion reaction. Patient verbalized understanding. Second unit started at 1417, patient tolerated well. Vital signs stable at this time. After first unit of blood completed, Rossy stated that her headache improved to a 1/10 on the pain scale and declined Tylenol at this time. Blurry vision which was initially present had improved. Dr. Johnson ordered a repeat CBC for the morning. Stable on magnesium regimen at this time.

## 2024-02-27 NOTE — ANESTHESIA PROCEDURE NOTES
Airway  Date/Time: 2/26/2024 10:24 PM  Urgency: Elective      General Information and Staff    Patient location during procedure: OR  Anesthesiologist: Fish Owens MD  Performed: anesthesiologist   Performed by: Fish Owens MD  Authorized by: Fish Owens MD      Indications and Patient Condition  Indications for airway management: anesthesia  Sedation level: deep  Preoxygenated: yes  Patient position: sniffing  Mask difficulty assessment: 0 - not attempted    Final Airway Details  Final airway type: endotracheal airway      Successful airway: ETT  Cuffed: yes   Successful intubation technique: direct laryngoscopy  Endotracheal tube insertion site: oral  Blade: GlideScope  Blade size: #3  ETT size (mm): 7.0    Cormack-Lehane Classification: grade I - full view of glottis  Placement verified by: capnometry   Measured from: teeth  Number of attempts at approach: 1

## 2024-02-27 NOTE — PROGRESS NOTES
[facility]    Post  Progress Note      Rossy Johnson Patient Status:  Inpatient    1990 MRN [id number]   Location [facility] Attending Srinivasa Singleton, DO   Hosp Day # 3 PCP No primary care provider on file.       Subjective     Magnesium started at MN 24.   Has headache.  Able to eat breakfast.      Objective   Vital signs in last 24 hours:  Temp:  [97.8 °F (36.6 °C)-99.3 °F (37.4 °C)] 98.8 °F (37.1 °C)  Pulse:  [] 84  Resp:  [13-20] 16  BP: (101-165)/(56-99) 105/57  SpO2:  [94 %-100 %] 98 %    Input/Output:    Intake/Output Summary (Last 24 hours) at 2024 1107  Last data filed at 2024 1000  Gross per 24 hour   Intake 2985.42 ml   Output 4709 ml   Net -1723.58 ml       Constitutional: comfortable  Abdomen: soft, nontender  Uterus: fundus firm and nontender  Wound/Incision: dressing dry  Extremities: No calf tenderness, SCDs on while in bed      Results:   CBC:    Lab Results   Component Value Date    WBC 17.9 (H) 2024    WBC 6.2 2024    WBC 7.2 2024     Lab Results   Component Value Date    HGB 9.3 (L) 2024    HGB 11.7 (L) 2024    HGB 12.2 2024      Lab Results   Component Value Date    .0 2024    .0 (L) 2024    .0 2024        Assessment/Plan   POD #  1 --- stable  Pre-E with severe features.  On Magnesium since MN.  Will continue x 24 hrs  Platelets improved to normal after delivery.    Continue mag x 24 hrs  Routine PP care          Marleny Johnson MD  2024  11:07 AM

## 2024-02-27 NOTE — PROGRESS NOTES
Received call from RN stating Hgb down to 6.4.  bleeding normal.  Discussed risks/benefits of blood transfusion.  Will transfuse 2 units of PRBC.  Pt understand and agrees to proceed.

## 2024-02-27 NOTE — ANESTHESIA POSTPROCEDURE EVALUATION
Patient: Rossy Johnson    Procedure Summary       Date: 24 Room / Location: [facility] L+D OR 03 / [facility] L+D OR    Anesthesia Start:  Anesthesia Stop:     Procedure:  SECTION (Abdomen) Diagnosis:       Failure to progress in labor (HCC)      (Failure to progress in labor (HCC) [O62.2])    Surgeons: Srinivasa Singleton DO Anesthesiologist: Fish Owens MD    Anesthesia Type: epidural ASA Status: 2            Anesthesia Type: epidural    Vitals Value Taken Time   /95 24   Temp 97.4 24   Pulse 83 24   Resp 23 24   SpO2 100 % 24   Vitals shown include unfiled device data.    [facility] AN Post Evaluation:   Patient Evaluated in PACU  Patient Participation: complete - patient participated  Level of Consciousness: awake  Pain Score: 2  Pain Management: adequate  Airway Patency:patent  Dental exam unchanged from preop  Yes    Cardiovascular Status: hemodynamically stable  Respiratory Status: spontaneous ventilation  Postoperative Hydration euvolemic      Fish Owens MD  2024 11:22 PM

## 2024-02-27 NOTE — PROGRESS NOTES
[facility]     Section Delivery / Brief Operative Note    Rossy Johnson Patient Status:  Inpatient    1990 MRN [id number]   Location [facility] Attending Srinivasa Singleton DO   Hosp Day # 2 PCP No primary care provider on file.     Pre Op Diagnosis:  IUP at 41 wks, failure to progress: arrest of dilation    Post Op Diagnosis:  same as pre-op with transverse arrest    Procedure:  primary  LTCS     Surgeon:  Srinivasa Singleton DO    Assistant Surgeon:  Dr Montejo     Anesthesia: general by Dr Owens    Complications: none    Antibiotics:  Ancef 2 grams and Azithromycin 500 mg preoperatively    QBL: 1384 ml    Findings: normal uterus, normal tubes bilaterally, normal ovaries bilaterally. Live male infant, Apgars  8 & 9, weight 8 lbs,  14 oz delivered at 2224 in ROT position. Nuchal Cord:  none  Thick meconium amniotic fluid noted.    Sponge and Needle Counts:  Verified     Dictation # [id number]      Srinivasa Singleton DO  2024  11:45 PM

## 2024-02-27 NOTE — DISCHARGE INSTRUCTIONS
-Pelvic rest for 6 weeks; no sex, tampons, douching, baths, or pools until after 6 weeks check-up.  -No heavy lifting; increase activity gradually.  -No driving if taking narcotics.    CALL YOUR PROVIDER IF:  Increased/heavy bleeding (I.e. Changing a saturated pad every hour).  New onset chills/fever greater than 100.4.  Pain in your vagina or abdomen that gets worse and isn't relieved with medicine.  Swelling or discharge with a bad odor from vagina.  Burning, pain, red streaks, or lumpy areas in your breasts that may be accompanied by flu-like symptoms.  Painful urination, or inability to control urination.  Nausea, vomiting, dizziness, or fainting.  Feelings of extreme sadness or anxiety, or a feeling that you don’t want to be with your baby.  Redness, warmth, or pain in the lower leg.  Chest pain or shortness of breath.  If : Check incision site AT LEAST 2x daily for signs and symptoms of infection (increased redness, warmth, tenderness, or drainage)    Mom & Baby Hour: Meets in person every WEDNESDAY at 10am at the [facility] in [place] ([address]) in the community education room. The group is for new moms and their babies up to 6 months of age. It includes breastfeeding supports with a certified lactation educator to be available to answer your breastfeeding questions.         [facility] has great support for our families even after discharge.  We have virtual or in-person support groups.  Visit our website for the most up-to-date info for our many different support groups. [email or web address]       Outpatient Lactation appoints.  Call [phone]- to schedule an appt.  Our office is located in the Maternal Fetal Medicine office next to [facility] on the first floor.      Moms Support Groups  Our weekly New Mom Support Groups are for any new parents in our community. They are led by an experienced Mother/Baby nurse or IBCLC  and usually include a guest speaker on a topic of interest to new parents. These in-person groups also include Breastfeeding Support at each meeting. Bring your baby ( - 6 months) with you! Moms-to-be are also welcome! All mom's welcome even if its not your first.     MOM & BABY HOUR   Meets most  10:00 - 11:30 a.m.  Masks are not required, but be considerate of others and do not attend if mom or baby have had any symptoms of illness within the previous 24 hours. Breastfeeding support will be included at each session--just ask the leader any breastfeeding questions you may have. Location [facility] [place] [address] [place] Go inside the front door and to the right to the “Community Education Room”.    Mom's Line: [phone]   This service is provided by [facility], has a phone line dedicated for women (or anyone worried about a women) who may be experiencing signs or symptoms of postpartum depression.    Nurturing Mom- A support group for new and expectant moms looking for support with the transition to parenthood as well as those experiencing symptoms of  anxiety and/or depression.  Please contact [email or web address] if you need directions or the link for the virtual meetings. Please contact [email or web address] if you plan to attend, but please be considerate of others and do not attend if mom or baby have had any symptoms of illness within the previous 24 hours.     La Leche League for breast feeding and parent support, Website: IIIus.org  and for the [place] group and other groups visit [email or web address]  to help find a group, all meetings are virtual.     Facebook groups-  for more support when home- [facility]ies & Mommies of [facility] --- you can find mom-to-mom advice and the list of speaker topics for cradle talk program.     Helpful websites:     www.llli.org  www.Kellymom.com  www.Breastfeed[place].org  Www.ppdil.org/    - The Postpartum Depression [place] [employer] [place]: Volunteer organization that  provide informed support and information to women and their families who are experiencing pregnancy and/or postpartum mood disorders either by phone or email.  Initiation of breast pumping after discharge:     - Add 1 pumping session a day, additional to the infant's feedings, 2-3 weeks after delivery.     - Pump both breasts for 15 mins, immediately after a breast feeding session.    - Pumping first thing in the morning will provide greater output.    - If you chose to pump more than once a day, you should be consistent every day to prevent a breast infection.      - Pump using an electric pump over a hands free pump (electric pumps provided stronger stimulation to the nipples).    - Store the breast milk in 2-3 oz containers.     - Label containers with date and time.    - Always feed oldest milk first.

## 2024-02-27 NOTE — CM/SW NOTE
SW self referral due to finances/WIC resources    SW met with patient and FOB  bedside.  SW confirmed face sheet contact as correct.    Baby boy/girl name: Ruby Cruz  Date & time of delivery:2/26/24 @ 10:24pm  Delivery method:  primary  LTCS    Siblings age:n/a    Patient employed: Yes  Length of maternity leave:12 weeks    Father of baby employed:Yes  Length of paternity leave:12 weeks    Breast or formula feed:Breast and formula feed    Pediatrician:[facility]  SW encouraged pt to schedule infant first appointment (usually within 48 hours of discharge) prior to pt discharge. Pt expressed understanding.     Infant Insurance:Medicaid  [employer] [facility] contacted:Yes    Mental Health History:Denied     Medications:n/a    Therapist:n/a    Psychiatrist:n/a    SW discussed signs, symptoms and risks associated with post partum depression & anxiety.  SW provided pt with PMAD resources.  Other resources provided:[employer] transportation and mental health resources.  [facility] specific resources.  Pt endorses she is current w/WIC services and was encouraged to contact them informing of infants birth.  Pt expressed understanding.     Patient support system:FOB and mother    Patient denied current questions/needs from SW.    SW/CM to remain available for support and/or discharge planning.      Marleny Peña, MSW, LSW  Social Work   Ext:#[phone]

## 2024-02-28 LAB
BASOPHILS # BLD AUTO: 0.02 X10(3) UL (ref 0–0.2)
BASOPHILS NFR BLD AUTO: 0.2 %
BLOOD TYPE BARCODE: 5100
DEPRECATED RDW RBC AUTO: 47.8 FL (ref 35.1–46.3)
EOSINOPHIL # BLD AUTO: 0.11 X10(3) UL (ref 0–0.7)
EOSINOPHIL NFR BLD AUTO: 0.9 %
ERYTHROCYTE [DISTWIDTH] IN BLOOD BY AUTOMATED COUNT: 14.9 % (ref 11–15)
HCT VFR BLD AUTO: 23.4 %
HGB BLD-MCNC: 8.2 G/DL
IMM GRANULOCYTES # BLD AUTO: 0.09 X10(3) UL (ref 0–1)
IMM GRANULOCYTES NFR BLD: 0.7 %
LYMPHOCYTES # BLD AUTO: 2.5 X10(3) UL (ref 1–4)
LYMPHOCYTES NFR BLD AUTO: 19.6 %
MCH RBC QN AUTO: 30.9 PG (ref 26–34)
MCHC RBC AUTO-ENTMCNC: 35 G/DL (ref 31–37)
MCV RBC AUTO: 88.3 FL
MONOCYTES # BLD AUTO: 0.75 X10(3) UL (ref 0.1–1)
MONOCYTES NFR BLD AUTO: 5.9 %
NEUTROPHILS # BLD AUTO: 9.27 X10 (3) UL (ref 1.5–7.7)
NEUTROPHILS # BLD AUTO: 9.27 X10(3) UL (ref 1.5–7.7)
NEUTROPHILS NFR BLD AUTO: 72.7 %
PLATELET # BLD AUTO: 146 10(3)UL (ref 150–450)
RBC # BLD AUTO: 2.65 X10(6)UL
UNIT VOLUME: 350 ML
WBC # BLD AUTO: 12.7 X10(3) UL (ref 4–11)

## 2024-02-28 NOTE — PLAN OF CARE
Problem: GASTROINTESTINAL - ADULT  Goal: Minimal or absence of nausea and vomiting  Description: INTERVENTIONS:  - Maintain adequate hydration with IV or PO as ordered and tolerated  - Nasogastric tube to low intermittent suction as ordered  - Evaluate effectiveness of ordered antiemetic medications  - Provide nonpharmacologic comfort measures as appropriate  - Advance diet as tolerated, if ordered  - Obtain nutritional consult as needed  - Evaluate fluid balance  Outcome: Progressing  Goal: Maintains or returns to baseline bowel function  Description: INTERVENTIONS:  - Assess bowel function  - Maintain adequate hydration with IV or PO as ordered and tolerated  - Evaluate effectiveness of GI medications  - Encourage mobilization and activity  - Obtain nutritional consult as needed  - Establish a toileting routine/schedule  - Consider collaborating with pharmacy to review patient's medication profile  Outcome: Progressing  Goal: Maintains adequate nutritional intake (undernourished)  Description: INTERVENTIONS:  - Monitor percentage of each meal consumed  - Identify factors contributing to decreased intake, treat as appropriate  - Assist with meals as needed  - Monitor I&O, WT and lab values  - Obtain nutritional consult as needed  - Optimize oral hygiene and moisture  - Encourage food from home; allow for food preferences  - Enhance eating environment  Outcome: Progressing  Goal: Achieves appropriate nutritional intake (bariatric)  Description: INTERVENTIONS:  - Monitor for over-consumption  - Identify factors contributing to increased intake, treat as appropriate  - Monitor I&O, WT and lab values  - Obtain nutritional consult as needed  - Evaluate psychosocial factors contributing to over-consumption  Outcome: Progressing     Problem: POSTPARTUM  Goal: Long Term Goal:Experiences normal postpartum course  Description: INTERVENTIONS:  - Assess and monitor vital signs and lab values.  - Assess fundus and  lochia.  - Provide ice/sitz baths for perineum discomfort.  - Monitor healing of incision/episiotomy/laceration, and assess for signs and symptoms of infection and hematoma.  - Assess bladder function and monitor for bladder distention.  - Provide/instruct/assist with pericare as needed.  - Provide VTE prophylaxis as needed.  - Monitor bowel function.  - Encourage ambulation and provide assistance as needed.  - Assess and monitor emotional status and provide social service/psych resources as needed.  - Utilize standard precautions and use personal protective equipment as indicated. Ensure aseptic care of all intravenous lines and invasive tubes/drains.  - Obtain immunization and exposure to communicable diseases history.  Outcome: Progressing  Goal: Optimize infant feeding at the breast  Description: INTERVENTIONS:  - Initiate breast feeding within first hour after birth.   - Monitor effectiveness of current breast feeding efforts.  - Assess support systems available to mother/family.  - Identify cultural beliefs/practices regarding lactation, letdown techniques, maternal food preferences.  - Assess mother's knowledge and previous experience with breast feeding.  - Provide information as needed about early infant feeding cues (e.g., rooting, lip smacking, sucking fingers/hand) versus late cue of crying.  - Discuss/demonstrate breast feeding aids (e.g., infant sling, nursing footstool/pillows, and breast pumps).  - Encourage mother/other family members to express feelings/concerns, and actively listen.  - Educate father/SO about benefits of breast feeding and how to manage common lactation challenges.  - Recommend avoidance of specific medications or substances incompatible with breast feeding.  - Assess and monitor for signs of nipple pain/trauma.  - Instruct and provide assistance with proper latch.  - Review techniques for milk expression (breast pumping) and storage of breast milk. Provide pumping  equipment/supplies, instructions and assistance, as needed.  - Encourage rooming-in and breast feeding on demand.  - Encourage skin-to-skin contact.  - Provide LC support as needed.  - Assess for and manage engorgement.  - Provide breast feeding education handouts and information on community breast feeding support.   Outcome: Progressing  Goal: Establishment of adequate milk supply with medication/procedure interruptions  Description: INTERVENTIONS:  - Review techniques for milk expression (breast pumping).   - Provide pumping equipment/supplies, instructions, and assistance until it is safe to breastfeed infant.  Outcome: Progressing  Goal: Experiences normal breast weaning course  Description: INTERVENTIONS:  - Assess for and manage engorgement.  - Instruct on breast care.  - Provide comfort measures.  Outcome: Progressing  Goal: Appropriate maternal -  bonding  Description: INTERVENTIONS:  - Assess caregiver- interactions.  - Assess caregiver's emotional status and coping mechanisms.  - Encourage caregiver to participate in  daily care.  - Assess support systems available to mother/family.  - Provide /case management support as needed.  Outcome: Progressing

## 2024-02-28 NOTE — PLAN OF CARE
Problem: POSTPARTUM  Goal: Long Term Goal:Experiences normal postpartum course  Description: INTERVENTIONS:  - Assess and monitor vital signs and lab values.  - Assess fundus and lochia.  - Provide ice/sitz baths for perineum discomfort.  - Monitor healing of incision/episiotomy/laceration, and assess for signs and symptoms of infection and hematoma.  - Assess bladder function and monitor for bladder distention.  - Provide/instruct/assist with pericare as needed.  - Provide VTE prophylaxis as needed.  - Monitor bowel function.  - Encourage ambulation and provide assistance as needed.  - Assess and monitor emotional status and provide social service/psych resources as needed.  - Utilize standard precautions and use personal protective equipment as indicated. Ensure aseptic care of all intravenous lines and invasive tubes/drains.  - Obtain immunization and exposure to communicable diseases history.  2/28/2024 1105 by Mily Woodward, RN  Outcome: Progressing  2/28/2024 1104 by Mily Woodward, RN  Outcome: Progressing  Goal: Optimize infant feeding at the breast  Description: INTERVENTIONS:  - Initiate breast feeding within first hour after birth.   - Monitor effectiveness of current breast feeding efforts.  - Assess support systems available to mother/family.  - Identify cultural beliefs/practices regarding lactation, letdown techniques, maternal food preferences.  - Assess mother's knowledge and previous experience with breast feeding.  - Provide information as needed about early infant feeding cues (e.g., rooting, lip smacking, sucking fingers/hand) versus late cue of crying.  - Discuss/demonstrate breast feeding aids (e.g., infant sling, nursing footstool/pillows, and breast pumps).  - Encourage mother/other family members to express feelings/concerns, and actively listen.  - Educate father/SO about benefits of breast feeding and how to manage common lactation challenges.  - Recommend avoidance of specific  medications or substances incompatible with breast feeding.  - Assess and monitor for signs of nipple pain/trauma.  - Instruct and provide assistance with proper latch.  - Review techniques for milk expression (breast pumping) and storage of breast milk. Provide pumping equipment/supplies, instructions and assistance, as needed.  - Encourage rooming-in and breast feeding on demand.  - Encourage skin-to-skin contact.  - Provide LC support as needed.  - Assess for and manage engorgement.  - Provide breast feeding education handouts and information on community breast feeding support.   2024 by Mily Woodward RN  Outcome: Progressing  2024 by Mily Woodward, RN  Outcome: Progressing  Goal: Appropriate maternal -  bonding  Description: INTERVENTIONS:  - Assess caregiver- interactions.  - Assess caregiver's emotional status and coping mechanisms.  - Encourage caregiver to participate in  daily care.  - Assess support systems available to mother/family.  - Provide /case management support as needed.  2024 by Mily Woodward RN  Outcome: Progressing  2024 by Mily Woodward, RN  Outcome: Progressing

## 2024-02-28 NOTE — PLAN OF CARE
Problem: Patient Centered Care  Goal: Patient preferences are identified and integrated in the patient's plan of care  Description: Interventions:  - What would you like us to know as we care for you? First baby, would like to work with lactation  - Provide timely, complete, and accurate information to patient/family  - Incorporate patient and family knowledge, values, beliefs, and cultural backgrounds into the planning and delivery of care  - Encourage patient/family to participate in care and decision-making at the level they choose  - Honor patient and family perspectives and choices  Outcome: Progressing     Problem: Patient/Family Goals  Goal: Patient/Family Long Term Goal  Description: Patient's Long Term Goal: discharge home    Interventions:  - See additional Care Plan goals for specific interventions  Outcome: Progressing  Goal: Patient/Family Short Term Goal  Description: Patient's Short Term Goal: pain control    Interventions:  -Non Pharmacological pain interventions  -IV/IM pain medication per physician order and patient's request  -Education  -Involve patient in POC  - See additional Care Plan goals for specific interventions  Outcome: Progressing     Problem: GASTROINTESTINAL - ADULT  Goal: Minimal or absence of nausea and vomiting  Description: INTERVENTIONS:  - Maintain adequate hydration with IV or PO as ordered and tolerated  - Nasogastric tube to low intermittent suction as ordered  - Evaluate effectiveness of ordered antiemetic medications  - Provide nonpharmacologic comfort measures as appropriate  - Advance diet as tolerated, if ordered  - Obtain nutritional consult as needed  - Evaluate fluid balance  Outcome: Progressing  Goal: Maintains or returns to baseline bowel function  Description: INTERVENTIONS:  - Assess bowel function  - Maintain adequate hydration with IV or PO as ordered and tolerated  - Evaluate effectiveness of GI medications  - Encourage mobilization and activity  - Obtain  nutritional consult as needed  - Establish a toileting routine/schedule  - Consider collaborating with pharmacy to review patient's medication profile  Outcome: Progressing  Goal: Maintains adequate nutritional intake (undernourished)  Description: INTERVENTIONS:  - Monitor percentage of each meal consumed  - Identify factors contributing to decreased intake, treat as appropriate  - Assist with meals as needed  - Monitor I&O, WT and lab values  - Obtain nutritional consult as needed  - Optimize oral hygiene and moisture  - Encourage food from home; allow for food preferences  - Enhance eating environment  Outcome: Progressing  Goal: Achieves appropriate nutritional intake (bariatric)  Description: INTERVENTIONS:  - Monitor for over-consumption  - Identify factors contributing to increased intake, treat as appropriate  - Monitor I&O, WT and lab values  - Obtain nutritional consult as needed  - Evaluate psychosocial factors contributing to over-consumption  Outcome: Progressing     Problem: SKIN/TISSUE INTEGRITY - ADULT  Goal: Skin integrity remains intact  Description: INTERVENTIONS  - Assess and document risk factors for pressure ulcer development  - Assess and document skin integrity  - Monitor for areas of redness and/or skin breakdown  - Initiate interventions, skin care algorithm/standards of care as needed  Outcome: Progressing  Goal: Incision(s), wounds(s) or drain site(s) healing without S/S of infection  Description: INTERVENTIONS:  - Assess and document risk factors for pressure ulcer development  - Assess and document skin integrity  - Assess and document dressing/incision, wound bed, drain sites and surrounding tissue  - Implement wound care per orders  - Initiate isolation precautions as appropriate  - Initiate Pressure Ulcer prevention bundle as indicated  Outcome: Progressing  Goal: Oral mucous membranes remain intact  Description: INTERVENTIONS  - Assess oral mucosa and hygiene practices  - Implement  preventative oral hygiene regimen  - Implement oral medicated treatments as ordered  Outcome: Progressing     Problem: POSTPARTUM  Goal: Long Term Goal:Experiences normal postpartum course  Description: INTERVENTIONS:  - Assess and monitor vital signs and lab values.  - Assess fundus and lochia.  - Provide ice/sitz baths for perineum discomfort.  - Monitor healing of incision/episiotomy/laceration, and assess for signs and symptoms of infection and hematoma.  - Assess bladder function and monitor for bladder distention.  - Provide/instruct/assist with pericare as needed.  - Provide VTE prophylaxis as needed.  - Monitor bowel function.  - Encourage ambulation and provide assistance as needed.  - Assess and monitor emotional status and provide social service/psych resources as needed.  - Utilize standard precautions and use personal protective equipment as indicated. Ensure aseptic care of all intravenous lines and invasive tubes/drains.  - Obtain immunization and exposure to communicable diseases history.  Outcome: Progressing  Goal: Optimize infant feeding at the breast  Description: INTERVENTIONS:  - Initiate breast feeding within first hour after birth.   - Monitor effectiveness of current breast feeding efforts.  - Assess support systems available to mother/family.  - Identify cultural beliefs/practices regarding lactation, letdown techniques, maternal food preferences.  - Assess mother's knowledge and previous experience with breast feeding.  - Provide information as needed about early infant feeding cues (e.g., rooting, lip smacking, sucking fingers/hand) versus late cue of crying.  - Discuss/demonstrate breast feeding aids (e.g., infant sling, nursing footstool/pillows, and breast pumps).  - Encourage mother/other family members to express feelings/concerns, and actively listen.  - Educate father/SO about benefits of breast feeding and how to manage common lactation challenges.  - Recommend avoidance of  specific medications or substances incompatible with breast feeding.  - Assess and monitor for signs of nipple pain/trauma.  - Instruct and provide assistance with proper latch.  - Review techniques for milk expression (breast pumping) and storage of breast milk. Provide pumping equipment/supplies, instructions and assistance, as needed.  - Encourage rooming-in and breast feeding on demand.  - Encourage skin-to-skin contact.  - Provide LC support as needed.  - Assess for and manage engorgement.  - Provide breast feeding education handouts and information on community breast feeding support.   Outcome: Progressing  Goal: Establishment of adequate milk supply with medication/procedure interruptions  Description: INTERVENTIONS:  - Review techniques for milk expression (breast pumping).   - Provide pumping equipment/supplies, instructions, and assistance until it is safe to breastfeed infant.  Outcome: Progressing  Goal: Experiences normal breast weaning course  Description: INTERVENTIONS:  - Assess for and manage engorgement.  - Instruct on breast care.  - Provide comfort measures.  Outcome: Progressing  Goal: Appropriate maternal -  bonding  Description: INTERVENTIONS:  - Assess caregiver- interactions.  - Assess caregiver's emotional status and coping mechanisms.  - Encourage caregiver to participate in  daily care.  - Assess support systems available to mother/family.  - Provide /case management support as needed.  Outcome: Progressing

## 2024-02-29 VITALS
DIASTOLIC BLOOD PRESSURE: 89 MMHG | HEIGHT: 60 IN | WEIGHT: 152 LBS | RESPIRATION RATE: 16 BRPM | HEART RATE: 85 BPM | SYSTOLIC BLOOD PRESSURE: 131 MMHG | TEMPERATURE: 98 F | OXYGEN SATURATION: 96 % | BODY MASS INDEX: 29.84 KG/M2

## 2024-02-29 NOTE — TELEPHONE ENCOUNTER
Please try to avoid signing forms in the corner as it is not visible when printing and forms are not accepted this way. Thank you!     Dr. Singleton     *The ACKNOWLEDGE button has been moved to the top right ribbon*    Please sign off on form if you agree to: Medical certification of [facility]  (place your signature on the first page only)    -From your Inbasket, Highlight the patient and click Chart   -Double click the 12/14/23 Forms Completion telephone encounter  -Scroll down to the Media section   -Click the blue Hyperlink: Medical Cert Dr Singleton 02/29/24  -Click Acknowledge located in the top right ribbon/menu   -Drag the mouse into the blank space of the document and a + sign will appear. Left click to   electronically sign the document.     Thank you,     Malinda

## 2024-02-29 NOTE — LACTATION NOTE
02/29/24 1015   Problems identified   Problems identified Knowledge deficit;Previous breast surgery   Previous breast surgery Augmentation   Problems Identified Other patient is currently on mag, and had low H&H and just received 2 units of PRBCs   Maternal history   Maternal history Induction of labor;PPH;Anemia   Other/comment Preeclampsia, post dates   Breastfeeding goal   Breastfeeding goal To maintain breast milk feeding per patient goal   Maternal Assessment   Prior breastfeeding experience (comment below) Primip   Breastfeeding Assistance Pumping assistance provided with permission   Pain assessment   Location/Comment denies   Guidelines for use of:   Breast pump type Spectra;Hand Pump;Ameda Platinum   Current use of pump: every three hours   Suggested use of pump Pump each time a supplement is offered;Pump if infant is not latching to breast   Other (comment) Mom states that infant has a hard time latching and has decided to pump and feed infant, Hx breast augmentation and blood loss discussed, handout for [facility] given, encouraged to call if needed.

## 2024-02-29 NOTE — PLAN OF CARE
Problem: Patient Centered Care  Goal: Patient preferences are identified and integrated in the patient's plan of care  Description: Interventions:  - What would you like us to know as we care for you?   - Provide timely, complete, and accurate information to patient/family  - Incorporate patient and family knowledge, values, beliefs, and cultural backgrounds into the planning and delivery of care  - Encourage patient/family to participate in care and decision-making at the level they choose  - Honor patient and family perspectives and choices  Outcome: Progressing     Problem: Patient/Family Goals  Goal: Patient/Family Long Term Goal  Description: Patient's Long Term Goal:     Interventions:  -   - See additional Care Plan goals for specific interventions  Outcome: Progressing  Goal: Patient/Family Short Term Goal  Description: Patient's Short Term Goal:     Interventions:   -   - See additional Care Plan goals for specific interventions  Outcome: Progressing     Problem: GASTROINTESTINAL - ADULT  Goal: Minimal or absence of nausea and vomiting  Description: INTERVENTIONS:  - Maintain adequate hydration with IV or PO as ordered and tolerated  - Nasogastric tube to low intermittent suction as ordered  - Evaluate effectiveness of ordered antiemetic medications  - Provide nonpharmacologic comfort measures as appropriate  - Advance diet as tolerated, if ordered  - Obtain nutritional consult as needed  - Evaluate fluid balance  Outcome: Progressing  Goal: Maintains or returns to baseline bowel function  Description: INTERVENTIONS:  - Assess bowel function  - Maintain adequate hydration with IV or PO as ordered and tolerated  - Evaluate effectiveness of GI medications  - Encourage mobilization and activity  - Obtain nutritional consult as needed  - Establish a toileting routine/schedule  - Consider collaborating with pharmacy to review patient's medication profile  Outcome: Progressing  Goal: Maintains adequate  nutritional intake (undernourished)  Description: INTERVENTIONS:  - Monitor percentage of each meal consumed  - Identify factors contributing to decreased intake, treat as appropriate  - Assist with meals as needed  - Monitor I&O, WT and lab values  - Obtain nutritional consult as needed  - Optimize oral hygiene and moisture  - Encourage food from home; allow for food preferences  - Enhance eating environment  Outcome: Progressing  Goal: Achieves appropriate nutritional intake (bariatric)  Description: INTERVENTIONS:  - Monitor for over-consumption  - Identify factors contributing to increased intake, treat as appropriate  - Monitor I&O, WT and lab values  - Obtain nutritional consult as needed  - Evaluate psychosocial factors contributing to over-consumption  Outcome: Progressing     Problem: SKIN/TISSUE INTEGRITY - ADULT  Goal: Skin integrity remains intact  Description: INTERVENTIONS  - Assess and document risk factors for pressure ulcer development  - Assess and document skin integrity  - Monitor for areas of redness and/or skin breakdown  - Initiate interventions, skin care algorithm/standards of care as needed  Outcome: Progressing  Goal: Incision(s), wounds(s) or drain site(s) healing without S/S of infection  Description: INTERVENTIONS:  - Assess and document risk factors for pressure ulcer development  - Assess and document skin integrity  - Assess and document dressing/incision, wound bed, drain sites and surrounding tissue  - Implement wound care per orders  - Initiate isolation precautions as appropriate  - Initiate Pressure Ulcer prevention bundle as indicated  Outcome: Progressing  Goal: Oral mucous membranes remain intact  Description: INTERVENTIONS  - Assess oral mucosa and hygiene practices  - Implement preventative oral hygiene regimen  - Implement oral medicated treatments as ordered  Outcome: Progressing     Problem: POSTPARTUM  Goal: Long Term Goal:Experiences normal postpartum  course  Description: INTERVENTIONS:  - Assess and monitor vital signs and lab values.  - Assess fundus and lochia.  - Provide ice/sitz baths for perineum discomfort.  - Monitor healing of incision/episiotomy/laceration, and assess for signs and symptoms of infection and hematoma.  - Assess bladder function and monitor for bladder distention.  - Provide/instruct/assist with pericare as needed.  - Provide VTE prophylaxis as needed.  - Monitor bowel function.  - Encourage ambulation and provide assistance as needed.  - Assess and monitor emotional status and provide social service/psych resources as needed.  - Utilize standard precautions and use personal protective equipment as indicated. Ensure aseptic care of all intravenous lines and invasive tubes/drains.  - Obtain immunization and exposure to communicable diseases history.  Outcome: Progressing  Goal: Optimize infant feeding at the breast  Description: INTERVENTIONS:  - Initiate breast feeding within first hour after birth.   - Monitor effectiveness of current breast feeding efforts.  - Assess support systems available to mother/family.  - Identify cultural beliefs/practices regarding lactation, letdown techniques, maternal food preferences.  - Assess mother's knowledge and previous experience with breast feeding.  - Provide information as needed about early infant feeding cues (e.g., rooting, lip smacking, sucking fingers/hand) versus late cue of crying.  - Discuss/demonstrate breast feeding aids (e.g., infant sling, nursing footstool/pillows, and breast pumps).  - Encourage mother/other family members to express feelings/concerns, and actively listen.  - Educate father/SO about benefits of breast feeding and how to manage common lactation challenges.  - Recommend avoidance of specific medications or substances incompatible with breast feeding.  - Assess and monitor for signs of nipple pain/trauma.  - Instruct and provide assistance with proper latch.  - Review  techniques for milk expression (breast pumping) and storage of breast milk. Provide pumping equipment/supplies, instructions and assistance, as needed.  - Encourage rooming-in and breast feeding on demand.  - Encourage skin-to-skin contact.  - Provide LC support as needed.  - Assess for and manage engorgement.  - Provide breast feeding education handouts and information on community breast feeding support.   Outcome: Progressing  Goal: Establishment of adequate milk supply with medication/procedure interruptions  Description: INTERVENTIONS:  - Review techniques for milk expression (breast pumping).   - Provide pumping equipment/supplies, instructions, and assistance until it is safe to breastfeed infant.  Outcome: Progressing  Goal: Experiences normal breast weaning course  Description: INTERVENTIONS:  - Assess for and manage engorgement.  - Instruct on breast care.  - Provide comfort measures.  Outcome: Progressing  Goal: Appropriate maternal -  bonding  Description: INTERVENTIONS:  - Assess caregiver- interactions.  - Assess caregiver's emotional status and coping mechanisms.  - Encourage caregiver to participate in  daily care.  - Assess support systems available to mother/family.  - Provide /case management support as needed.  Outcome: Progressing

## 2024-02-29 NOTE — PLAN OF CARE
Discharge Note  Discharge order received from MD. IV removed. Aware of follow up appointments. Discussed what to expect after discharge and when to call physician. went over discharge AVS with patient. Patient states understanding. Pt aware of pelvic rest for 6 weeks. Pt wheel chaired down.     Electronically sent prescriptions: none  Printed Scripts: none      Witnessed mom sign release of custody form for infant.

## 2024-02-29 NOTE — DISCHARGE SUMMARY
[facility]  part of [facility]    Discharge Summary    Rossy Johnson Patient Status:  Inpatient    1990 MRN [id number]   Location [facility] 3SE Attending Srinivasa Singleton, DO   Hosp Day # 5       Admission date:  2024    Delivering OB Clinician: Areli    EDC: Estimated Date of Delivery: 24    Gestational Age: 41w1d    Antepartum complications:   Patient Active Problem List   Diagnosis    Lupus anticoagulant positive    History of uterine fibroid    Anemia affecting pregnancy (HCC)    Itching of both hands    Pregnancy (HCC)    Mild pre-eclampsia in third trimester (HCC)    Failure to progress in labor (HCC)    Severe pre-eclampsia in third trimester (HCC)       Date of Delivery: 2024  Time of Delivery: 10:24 PM     Delivery Type: primary  section, low transverse incision    Baby: Liveborn male   Information for the patient's :  Servando Johnson [[id number]]   8 lb 13.8 oz (4.02 kg)   Apgars:  1 minute: 8   5 minutes: 9 10 minutes:       Intrapartum Complications: transverse arrest    Discharge Date: 2024    Hospital Course: pt admitted for IOL for mild PreE and had  section performed for failure to progress. No complications. Routine delivery and postpartum care.    Discharge Physical Exam:   /89   Pulse 85   Temp 98.1 °F (36.7 °C) (Oral)   Resp 16   Ht 5' (1.524 m)   Wt 152 lb (68.9 kg)   LMP 2023 (Exact Date)   SpO2 96%   Breastfeeding Yes   BMI 29.69 kg/m²   General appearance:  alert, appears stated age, cooperative and no distress  Abdominal: soft, non-tender, no rebound  Uterus: firm, nontender, below umbilicus  Incision: clean, dry and intact  Pelvic: deferred  Extremities: Homans sign is negative, no sign of DVT    Discharged Condition: stable    Disposition: home    Plan:     Follow-up appointment in 6 weeks with Dr. Singleton        2024  3:42 PM

## 2024-03-04 NOTE — PAYOR COMM NOTE
--------------  DISCHARGE REVIEW    Payor: TERESA ODELL  Subscriber #:  [id number]  Authorization Number: N/A    Admit date: 24  Admit time:  12:01 PM  Discharge Date: 2024  4:27 PM     Admitting Physician: Rosalva Sahu MD  Attending Physician:  No att. providers found  Primary Care Physician: No primary care provider on file.          Discharge Summary Notes        Discharge Summary signed by Rosalva Sahu MD at 2024  3:45 PM       Author: Rosalva Sahu MD Specialty: OBSTETRICS & GYNECOLOGY Author Type: Physician    Filed: 2024  3:45 PM Date of Service: 2024  3:42 PM Status: Signed    : Rosalva Sahu MD (Physician)           [facility]  part of [facility]    Discharge Summary    Rossy Johnson Patient Status:  Inpatient    1990 MRN [id number]   Location [facility] 3SE Attending Srinivasa Singleton, DO   Hosp Day # 5       Admission date:  2024    Delivering OB Clinician: Areli    EDC: Estimated Date of Delivery: 24    Gestational Age: 41w1d    Antepartum complications:   Patient Active Problem List   Diagnosis    Lupus anticoagulant positive    History of uterine fibroid    Anemia affecting pregnancy (HCC)    Itching of both hands    Pregnancy (HCC)    Mild pre-eclampsia in third trimester (HCC)    Failure to progress in labor (HCC)    Severe pre-eclampsia in third trimester (HCC)       Date of Delivery: 2024  Time of Delivery: 10:24 PM     Delivery Type: primary  section, low transverse incision    Baby: Liveborn male   Information for the patient's :  Servando Johnson [[id number]]   8 lb 13.8 oz (4.02 kg)   Apgars:  1 minute: 8   5 minutes: 9 10 minutes:       Intrapartum Complications: transverse arrest    Discharge Date: 2024    Hospital Course: pt admitted for IOL for mild PreE and had  section performed for failure to progress. No complications. Routine delivery and postpartum  care.    Discharge Physical Exam:   /89   Pulse 85   Temp 98.1 °F (36.7 °C) (Oral)   Resp 16   Ht 5' (1.524 m)   Wt 152 lb (68.9 kg)   LMP 05/07/2023 (Exact Date)   SpO2 96%   Breastfeeding Yes   BMI 29.69 kg/m²   General appearance:  alert, appears stated age, cooperative and no distress  Abdominal: soft, non-tender, no rebound  Uterus: firm, nontender, below umbilicus  Incision: clean, dry and intact  Pelvic: deferred  Extremities: Homans sign is negative, no sign of DVT    Discharged Condition: stable    Disposition: home    Plan:     Follow-up appointment in 6 weeks with Dr. Singleton        2/29/2024  3:42 PM

## 2024-03-04 NOTE — OPERATIVE REPORT
[facility]    PATIENT'S NAME: ERICKA AHRRELL   ATTENDING PHYSICIAN: Srinivasa Ford DO   OPERATING PHYSICIAN: Srinivasa Ford DO   PATIENT ACCOUNT#:   [id number]    LOCATION:  [address] [facility]  MEDICAL RECORD #:   [id number]       YOB: 1990  ADMISSION DATE:       2024      OPERATION DATE:  2024    OPERATIVE REPORT      PREOPERATIVE DIAGNOSIS:  Pregnancy at 41 weeks with failure to progress and arrest of dilation.  POSTOPERATIVE DIAGNOSIS:  Pregnancy at 41 weeks with failure to progress and arrest of dilation, transverse arrest.  PROCEDURE:  Primary low transverse  section.    ASSISTANT SURGEON:  Kari Montejo MD    ANESTHESIA:  General by Fish Owens MD.    COMPLICATIONS:  None apparent.    PATHOLOGY SPECIMEN:  Placenta and cord.    QUANTITATIVE BLOOD LOSS:  1384 mL.    FINDINGS:  There was normal uterus, tubes and ovaries bilaterally.  There was a live male infant delivered at 10:24 p.m. in right occiput transverse position.  He had Apgar scores of 8 and 9 respectively at one and five minutes.  His weight was 8 pounds 14 ounces.  There was no nuchal cord noted.  There was thick meconium-stained fluid noted.    OPERATIVE TECHNIQUE:  After consent was obtained, the patient was taken to the operating suite.  She was prepped and draped in the usual fashion while Dr. Owens attempted to bring the epidural to a surgical level.  We were not successful even after waiting appropriate period of time.  Dr. Owens recommended general anesthesia, and I agreed.  Again, the patient had been previously prepped.  She had induction of general anesthesia.  I then made a Pfannenstiel incision across the low abdomen, and this was carried down to the rectus fascia.  Fascia was incised and carried to the limits of the skin incision.  The fascia was sharply and bluntly dissected away from the musculature which was split at the midline exposing the peritoneum.  Peritoneum was entered  bluntly and extended bluntly as well.  The vesicouterine peritoneal reflection was identified, incised and carried bilaterally.  Bladder flap was developed with sharp and blunt dissection.  Second knife was used to make a low transverse uterine incision down to the level of the membranes which were incised showing further previously known thick meconium-stained fluid.  The uterine incision was extended bluntly.  Fetal head was rotated from right occiput transverse to right occiput anterior and delivered with the aid of fundal pressure.  Oropharyngeal and nasal secretions were aspirated with a bulb syringe.  The remainder of the baby was delivered with fundal pressure.  Baby was vigorous on delivery, and based on  recommendations, we did delayed cord clamp for 30 seconds.  The cord was then doubly clamped, cut, and the infant transferred to the  staff.  Cord blood sample was obtained as was cord segment for pH.  Next, the placenta was delivered manually.  The uterine cavity was swabbed of any excess clot and membranous fragments until clear.  The uterus was rather hypotonic, and I asked immediately for Pitocin at 600 mL/h.  I then subsequently asked for IM methergine and after no great response IM Hemabate.  We continued to close the uterus in a running locked fashion with a suture of #1 chromic.  A single area of bleeding was seen just at the midportion of the incision, and this was ligated with a single figure-of-eight suture of #1 chromic.  Good hemostasis was observed at the uterine incision.  The aforementioned medications were helping to firm the uterus.  We then swabbed and irrigated the cul-de-sac and the pericolic gutters until clear of any excess clot and blood.  We examined the uterine incision again without bleeding noted.  Peritoneum was closed with a running suture of #1 chromic.  The rectus musculature was examined with no bleeding noted.  The fascia was closed with a running suture of  0 Vicryl.  Subcutaneous tissue was irrigated and closed with a running subcutaneous suture of 2-0 plain gut.  This was followed by skin closure with a running subcuticular suture of 4-0 Monocryl.  All final counts were noted to be correct.  The patient was then awakened from anesthesia and taken to postanesthesia care unit in stable condition.  The surgical assistant provided aid in exposure, hemostasis, closure, and other intraoperative technical functions to help the surgeon carry out a safe operation and optimize results.     Dictated By Srinivasa Ford DO  d: 03/04/2024 07:36:07  t: 03/04/2024 09:58:31  Job [id number]  [facility]/    cc: Srinivasa Ford DO

## 2024-03-05 ENCOUNTER — PATIENT OUTREACH (OUTPATIENT)
Dept: CASE MANAGEMENT | Age: 34
End: 2024-03-05

## 2024-03-12 ENCOUNTER — TELEPHONE (OUTPATIENT)
Dept: OBGYN UNIT | Facility: HOSPITAL | Age: 34
End: 2024-03-12

## 2024-03-15 ENCOUNTER — TELEPHONE (OUTPATIENT)
Dept: OBGYN CLINIC | Facility: CLINIC | Age: 34
End: 2024-03-15

## 2024-03-15 NOTE — TELEPHONE ENCOUNTER
Pt states her csection has a slight opening and odor, also states was told to set up appointment prior to 6 week pp visit with ANANDA. Please advise

## 2024-03-15 NOTE — TELEPHONE ENCOUNTER
Pt delivered via  on 2024. Pt states she notes an open in the corner of the left side of the incision, c/o odor. Pink in that area. No drainage. States the area 'stings\" some. No body aches, chills or fever. Pt informed we would need her to come in tomorrow to look at incision, pt accepts 10 am slot.     Pt also indicated ANANDA instructed her to come in before 6 wks for BP check. Pt noted to have Pre-E, was on magnesium sulfate post delivery. Pt states she also received blood post surgery. Pt is currently not on PO BP medication, nor is she checking her BP's at home. Pt informed we will address BP in office tomorrow. Denies any Pre-E s/s.

## 2024-03-16 ENCOUNTER — NURSE ONLY (OUTPATIENT)
Dept: OBGYN CLINIC | Facility: CLINIC | Age: 34
End: 2024-03-16
Payer: COMMERCIAL

## 2024-03-16 VITALS — WEIGHT: 130 LBS | BODY MASS INDEX: 25 KG/M2 | SYSTOLIC BLOOD PRESSURE: 117 MMHG | DIASTOLIC BLOOD PRESSURE: 81 MMHG

## 2024-03-16 DIAGNOSIS — Z01.30 BP CHECK: Primary | ICD-10-CM

## 2024-03-16 DIAGNOSIS — Z51.89 VISIT FOR WOUND CHECK: ICD-10-CM

## 2024-03-16 PROCEDURE — 99211 OFF/OP EST MAY X REQ PHY/QHP: CPT | Performed by: OBSTETRICS & GYNECOLOGY

## 2024-03-16 NOTE — PROGRESS NOTES
Pt in today for incision check and bp check. Pt delivered [birth year] at 41w1d via c/s with ANANDA. Pt had PreE during third trimester, was given mag sulfate after delivery. Pt did not receive any other medication nor is she current on any other bp medication. Pt has not been monitoring bp at home. In office readings were 117/81 and 110/82. Pt is asymptomatic. Per ANANDA pt okay to continue monitoring bp at home but does not need to follow up regarding bp check unless symptoms develop. Pt incision was slightly tender and red on the left side. Per ANANAD pt was to continue keeping area dry and clean, ordered sterri strips to be placed on both sides of incision. Incision site was cleansed, benzoin tincture applied and then sterri strips were placed. Pt instructed to maintain area dry and clean and to remove strips in shower if they have no already fallen out after 7 days. Pt will follow up at upcoming PP appointment.

## 2024-03-27 ENCOUNTER — TELEPHONE (OUTPATIENT)
Dept: OBGYN CLINIC | Facility: CLINIC | Age: 34
End: 2024-03-27

## 2024-03-27 NOTE — TELEPHONE ENCOUNTER
Pt calling to report that she was seen about 2 weeks ago for an opening in her incision and its happening again. Pt stated that steri strips were placed at time of last incision check but they fell off and pt noticed the area on incision is opened again as of yesterday. Pt stated that its in the same spot on the left side of incision. Pt stated that the incision is a little red and there is \"some pain\". Denies fevers. States she has some clear/bloody  drainage coming from opening on incision. Pt offered appt today for incision check but pt declined. Pt asking if she can be seen tomorrow and pt accepted appt for incision check tomorrow at 11am. Pt advised to keep area clean and dry and call If symptoms worsen.

## 2024-03-28 ENCOUNTER — NURSE ONLY (OUTPATIENT)
Dept: OBGYN CLINIC | Facility: CLINIC | Age: 34
End: 2024-03-28
Payer: MEDICAID

## 2024-03-28 ENCOUNTER — TELEPHONE (OUTPATIENT)
Dept: OBGYN CLINIC | Facility: CLINIC | Age: 34
End: 2024-03-28

## 2024-03-28 VITALS
HEART RATE: 85 BPM | BODY MASS INDEX: 26 KG/M2 | WEIGHT: 132 LBS | SYSTOLIC BLOOD PRESSURE: 105 MMHG | DIASTOLIC BLOOD PRESSURE: 68 MMHG

## 2024-03-28 DIAGNOSIS — Z51.89 VISIT FOR WOUND CHECK: Primary | ICD-10-CM

## 2024-03-28 NOTE — PROGRESS NOTES
PATIENT CAME IN TODAY FOR INCISION CHECK, PATIENT WAS SEEN ABOUT 2 WEEKS AGO FOR INCISION OPENING AND DRAINAGE,PATIENT STATES STERI-STRIPS WERE PLACED AND WERE TAKEN OFF AFTER 7 DAYS LIKE INSTRUCTED BY MEDICAL ASSISTANT, PATIENT STATED SHE HAD SOME REDNESS, SOME BLOODY DRAINAGE, AND ITCHINESS FROM INCISION, PROVIDER IN HOUSE WAS ADVISED OF PATIENTS CONCERNS, PROVIDER ON CALL PRESCRIBED A TOPICAL ANTIFUNGAL CREAM  FOR THE IRRITATION NYSTATIN 2 TIMES A DAY, PATIENT VERBALIZED UNDERSTANDING.

## 2024-03-28 NOTE — TELEPHONE ENCOUNTER
Pt was seen for incision check today.  Per MA, HALINA examined incision and would like nystatin sent to pharmacy.  Message to DARRELLK to verify if the ointment or cream should be sent in and also for directions for the pt.

## 2024-03-28 NOTE — TELEPHONE ENCOUNTER
Per JLK's verbal order, pt was to return in one week for incision check.  Pt is to use cream for no longer than 2 weeks.

## 2024-04-02 ENCOUNTER — OFFICE VISIT (OUTPATIENT)
Dept: RHEUMATOLOGY | Facility: CLINIC | Age: 34
End: 2024-04-02
Payer: COMMERCIAL

## 2024-04-02 VITALS
HEART RATE: 87 BPM | SYSTOLIC BLOOD PRESSURE: 122 MMHG | DIASTOLIC BLOOD PRESSURE: 79 MMHG | BODY MASS INDEX: 26 KG/M2 | HEIGHT: 60 IN

## 2024-04-02 DIAGNOSIS — M25.50 POLYARTHRALGIA: ICD-10-CM

## 2024-04-02 DIAGNOSIS — M06.09 RHEUMATOID ARTHRITIS OF MULTIPLE SITES WITH NEGATIVE RHEUMATOID FACTOR (HCC): Primary | ICD-10-CM

## 2024-04-02 PROCEDURE — 99214 OFFICE O/P EST MOD 30 MIN: CPT | Performed by: INTERNAL MEDICINE

## 2024-04-02 RX ORDER — HYDROXYCHLOROQUINE SULFATE 200 MG/1
300 TABLET, FILM COATED ORAL DAILY
Qty: 135 TABLET | Refills: 0 | Status: SHIPPED | OUTPATIENT
Start: 2024-04-02

## 2024-04-02 NOTE — PATIENT INSTRUCTIONS
You were seen today for rheumatoid arthritis  Having some more joint pain  Plan to start you on hydroxychloroquine 300 mg daily which is a pill and a half  Gives this medication some time to work  Will have you follow-up in 3 months  Get blood work before you see me

## 2024-04-02 NOTE — PROGRESS NOTES
Rossy Johnson is a 33 year old female.    HPI:     Chief Complaint   Patient presents with    Hand Pain       I had the pleasure of seeing Rossy Johnson on 4/2/2024 for follow up Polyarthralgia's 2/2 Seronegative RA    Current Medications:  none  Blood work:  Neg RF, CCP, HLA-B27  Neg APL panel  US b/l hands: Synovial thickening and hyperemia within the bilateral index and middle finger MCP.  Also on the bilateral radiolunate articulations.  Findings suspicious for inflammatory arthritis    Interval History:  This is a 33 yo F with hx of HLD, Migraine's referred for abnormal blood work and joint pain.  She currently is 19 weeks pregnant.  Started to develop joint pain in April prior to being pregnant.  Reports pain involving her hands, wrists, knees and feet.  She wakes in the morning with joint pain and feeling very sore.  Muscles can hurt to.  No swelling in her joints.  They do feel stiff.  Her ankle pain is significant, has not improved since April.  Feels weakness in her right hand.  Denies any rash or psoriasis.  Has some pain in her lower spine.  Currently not taking any medications for pain.  She was found to have abnormal blood work, positive PTT lupus anticoagulant but overall lupus anticoagulant was not detected.  Her mom has lupus.  She denies any rashes, sicca symptoms, oral ulcers, hair loss, lymphadenopathy, serositis, DVT or PE, miscarriages or RP.  Denies any history of stroke.  Denies any history of antiphospholipid syndrome in the family.    2/13/2024:  Presents for f/u of polyarthralgia's  Has pain in the hands and ankles. Has stiffness in the hands in the morning. Cannot make fist with right hand in the morning  No pain in shoulders or elbows or knees  Pain is not severe  No rash or psoriasis  No lower back pain   Mom has RA and SLE    4/2/2024:  Presents for follow-up of seronegative RA  US b/l hands: Synovial thickening and hyperemia within the bilateral index and middle finger MCP.   Also on the bilateral radiolunate articulations.  Findings suspicious for inflammatory arthritis  She is 5 weeks postpartum, delivered healthy baby boy  Having pain in the hands, wrists, mostly in right hand  No swelling in joints  No medications for pain right now  She is pumping           HISTORY:  Past Medical History:   Diagnosis Date    Decorative tattoo     Fibroids     History of anemia     RA (rheumatoid arthritis) ([facility])       Social Hx Reviewed   Family Hx Reviewed     Medications (Active prior to today's visit):  Current Outpatient Medications   Medication Sig Dispense Refill    nystatin-triamcinolone 100,000-0.1 Units/g-% External Cream Apply 1 Application topically 2 (two) times daily. 15 g 0    Ferrous Sulfate (SLOW FE OR) Take 85 mg by mouth. Pt unsure of brand and dosage      prenatal vitamin with DHA 27-0.8-228 MG Oral Cap Take 1 capsule by mouth daily.       .cmed  Allergies:  No Known Allergies      ROS:   All other ROS are negative.     PHYSICAL EXAM:   GEN: AAOx3, NAD  HEENT: EOMI, PERRLA, no injection or icterus, oral mucosa moist, no oral lesions. No lymphadenopathy. No facial rash  CVS: RRR, no murmurs rubs or gallops. Equal 2+ distal pulses.   LUNGS: CTAB, no increased work of breathing  ABDOMEN:  soft NT/ND, +BS, no HSM  SKIN: No rashes or skin lesions. No nail findings  MSK:  Cervical spine: FROM  Hands: no synovitis in DIP, PIP and MCP, strong full fists  Wrist: FROM, no pain or swelling or warmth on palpation  Elbow: FROM, no pain or swelling or warmth on palpation  Shoulders: FROM, no pain or swelling or warmth on palpation  Hip: normal log roll, no lateral hip pain, KATHY test negative b/l  Knees: FROM, no warmth or effusion present. No pain with ROM.   Ankles: FROM, no pain or swelling or warmth on palpation  Feet: no pain with MTP squeeze, no toe swelling or pain or warmth on palpation with FROM  Spine: no lumbar or sacral pain on palpation.  NEURO: Cranial nerves II-XII intact  grossly. 5/5 strength throughout in both upper and lower extremities, sensation intact.  PSYCH: normal mood       LABS:     Component      Latest Ref Rng 9/28/2023   Lupus Anticoag Screen Interp Conclusion: Negative…    PT      11.6 - 14.8 seconds 13.2    APTT      23.3 - 35.6 seconds 35.4    Hexagonal Phase Phospholipid      Negative  Negative    DRVVT Ratio      0.0 - 1.1  1.0    BETA 2 GLYCOPROTEIN 1 AB, IgG      <7 U/mL <0.8    BETA 2 GLYCOPROTEIN 1 AB, IgM      <7 U/mL <2.4    Cardiolipin IgG Antibody      <10 GPL 1.2    Cardiolipin IgM Antibody      <10 MPL 2.5    RHEUMATOID FACTOR      <15 IU/mL <10    HLA-B27 Negative        Imaging:     US b/l hands 1/2024:  Synovial thickening with hyperemia within the bilateral index and middle finger MCP joints and left thumb carpometacarpal joint.      Synovial thickening and hyperemia also seen involving the bilateral radiolunate articulations.      Overall constellation of findings are suspicious for inflammatory arthritis.      No joint effusion or osseous erosion.      Mild tenosynovitis of the bilateral index and middle finger flexor digitorum tendon sheaths.     ASSESSMENT/PLAN:     Polyarthralgia secondary to seronegative RA  - She started to have joint pain starting April 2023 in her hands, wrists, knees, ankles and feet  - Initial workup showed negative ESR, CRP, RF, CCP and HLA-B27  - She continued to have joint pain, ultrasound the hands were done showing evidence of hyperemia and active synovitis  - She is 5 weeks postpartum and having worsening pain in her hands and wrists.  - She would like to try hydroxychloroquine, she will take 300 mg daily based on her weight.  Already discussed risks and side effects with patient.  She will also take Tylenol as needed for her pain.  She is currently pumping    Positive PTT lupus anticoagulant  - Overall her lupus anticoagulant was negative.  No symptoms consistent with antiphospholipid syndrome.  Repeat APL panel  negative     Pt will f/u in 3 mos     Mayra Suarez MD  4/2/2024   1:50 PM

## 2024-04-05 ENCOUNTER — PATIENT MESSAGE (OUTPATIENT)
Dept: RHEUMATOLOGY | Facility: CLINIC | Age: 34
End: 2024-04-05

## 2024-04-05 NOTE — TELEPHONE ENCOUNTER
From: Rossy Johnson  To: Mayra Suarez  Sent: 4/5/2024 11:00 AM CDT  Subject: Rash     Hello, I started taking the hydroxychloroquine that you prescribed but I developed a rash yesterday night. The rash is on my stomach, arms, chest and behind my ears. Should I stop taking the medication?

## 2024-04-06 RX ORDER — METHYLPREDNISOLONE 4 MG/1
TABLET ORAL
Qty: 1 EACH | Refills: 0 | Status: SHIPPED | OUTPATIENT
Start: 2024-04-06

## 2024-04-06 NOTE — TELEPHONE ENCOUNTER
I called patient back, having worsening rash on her face and even in her ear.  No shortness of breath or tongue or throat swelling.  Claritin did not help.  I will be sending her a Medrol Dosepak

## 2024-04-10 ENCOUNTER — APPOINTMENT (OUTPATIENT)
Dept: PHYSICAL THERAPY | Age: 34
End: 2024-04-10
Attending: OBSTETRICS & GYNECOLOGY
Payer: COMMERCIAL

## 2024-04-10 RX ORDER — PREDNISONE 5 MG/1
TABLET ORAL
Qty: 30 TABLET | Refills: 0 | Status: SHIPPED | OUTPATIENT
Start: 2024-04-10 | End: 2024-04-21

## 2024-04-15 ENCOUNTER — POSTPARTUM (OUTPATIENT)
Dept: OBGYN CLINIC | Facility: CLINIC | Age: 34
End: 2024-04-15
Payer: COMMERCIAL

## 2024-04-15 VITALS
DIASTOLIC BLOOD PRESSURE: 70 MMHG | WEIGHT: 128.63 LBS | BODY MASS INDEX: 25 KG/M2 | HEART RATE: 73 BPM | SYSTOLIC BLOOD PRESSURE: 105 MMHG

## 2024-04-15 PROBLEM — O14.03 MILD PRE-ECLAMPSIA IN THIRD TRIMESTER (HCC): Status: RESOLVED | Noted: 2024-02-26 | Resolved: 2024-04-15

## 2024-04-15 PROBLEM — L29.9 ITCHING OF BOTH HANDS: Status: RESOLVED | Noted: 2023-12-14 | Resolved: 2024-04-15

## 2024-04-15 PROBLEM — Z34.90 PREGNANCY (HCC): Status: RESOLVED | Noted: 2024-02-24 | Resolved: 2024-04-15

## 2024-04-15 PROBLEM — O99.019 ANEMIA AFFECTING PREGNANCY (HCC): Status: RESOLVED | Noted: 2023-12-12 | Resolved: 2024-04-15

## 2024-04-15 PROBLEM — O14.13 SEVERE PRE-ECLAMPSIA IN THIRD TRIMESTER (HCC): Status: RESOLVED | Noted: 2024-02-26 | Resolved: 2024-04-15

## 2024-04-15 PROCEDURE — 96127 BRIEF EMOTIONAL/BEHAV ASSMT: CPT | Performed by: OBSTETRICS & GYNECOLOGY

## 2024-04-17 ENCOUNTER — APPOINTMENT (OUTPATIENT)
Dept: PHYSICAL THERAPY | Age: 34
End: 2024-04-17
Attending: OBSTETRICS & GYNECOLOGY
Payer: COMMERCIAL

## 2024-04-17 NOTE — PROGRESS NOTES
HPI    Rossy Johnson is a 33 year old female  here for 6 week post-partum visit.  Patient delivered a  male infant on 24-[facility].  Patient desires condom.for contraception.  Patient is breast & bottle feeding.   Patient denies symptoms of depression, [place]  depression scale score of 4 out of 30.      OBSTETRIC HISTORY  OB History    Para Term  AB Living   1 1 1     1   SAB IAB Ectopic Multiple Live Births         0 1      # Outcome Date GA Lbr Ronaldo/2nd Weight Sex Type Anes PTL Lv   1 Term 24 41w1d  8 lb 13.8 oz (4.02 kg) M Caesarean EPI, Gen N SAUL      Complications: Meconium, Preeclampsia, Failure to Progress in Second Stage       MEDICATIONS:    Current Outpatient Medications:     predniSONE 5 MG Oral Tab, Take 4 tablets (20 mg total) by mouth daily for 3 days, THEN 3 tablets (15 mg total) daily for 3 days, THEN 2 tablets (10 mg total) daily for 3 days, THEN 1 tablet (5 mg total) daily for 3 days. Take 4 tablets (20 mg total) by mouth every morning for 3 days, THEN 3 tablets (15 mg total) every morning for 3 days, THEN 2 tablets (10 mg total) every morning for 3 days. THEN 1 tablet (5 mg total) every morning for 3 days.., Disp: 30 tablet, Rfl: 0    Ferrous Sulfate (SLOW FE OR), Take 85 mg by mouth. Pt unsure of brand and dosage, Disp: , Rfl:     prenatal vitamin with DHA 27-0.8-228 MG Oral Cap, Take 1 capsule by mouth daily., Disp: , Rfl:     methylPREDNISolone 4 MG Oral Tablet Therapy Pack, Take as directed on package., Disp: 1 each, Rfl: 0    hydroxychloroquine 200 MG Oral Tab, Take 1.5 tablets (300 mg total) by mouth daily., Disp: 135 tablet, Rfl: 0    nystatin-triamcinolone 100,000-0.1 Units/g-% External Cream, Apply 1 Application topically 2 (two) times daily. (Patient not taking: Reported on 4/15/2024), Disp: 15 g, Rfl: 0    ALLERGIES:    Allergies   Allergen Reactions    Hydroxychloroquine RASH and ITCHING       PHYSICAL EXAM  Blood pressure 105/70, pulse 73,  weight 128 lb 9.6 oz (58.3 kg), last menstrual period 05/07/2023, currently breastfeeding.  General:  Well nourished, well developed woman in no acute distress  Abdomen:  soft, nontender, no masses. Incision healed well  External Genitalia: normal appearance, hair distribution, and no lesions  Vagina:  Normal appearance without lesions, no abnormal discharge  Cervix:  Normal without tenderness on motion  Uterus: normal in size, contour, position, mobility, without tenderness  Adnexa: normal without masses or tenderness  Perineum: well healed perineum  Anus: no hemorroids       ASSESSMENT/PLAN  Discussed all options of birthcontrol including ocps, minipill, Mirena or Paragard IUD, nuvaring, orthoevra patch, nexplanon, Depoprovera, condoms or tubal sterilization options. Patient has chosen condom.  Patient to return for annual gyne exam in February.

## 2024-04-24 ENCOUNTER — APPOINTMENT (OUTPATIENT)
Dept: PHYSICAL THERAPY | Age: 34
End: 2024-04-24
Attending: OBSTETRICS & GYNECOLOGY
Payer: COMMERCIAL

## 2024-04-29 ENCOUNTER — TELEPHONE (OUTPATIENT)
Dept: RHEUMATOLOGY | Facility: CLINIC | Age: 34
End: 2024-04-29

## 2024-04-29 RX ORDER — TRIAMCINOLONE ACETONIDE 1 MG/G
CREAM TOPICAL
Qty: 1 EACH | Refills: 0 | Status: SHIPPED | OUTPATIENT
Start: 2024-04-29

## 2024-05-08 ENCOUNTER — APPOINTMENT (OUTPATIENT)
Dept: PHYSICAL THERAPY | Age: 34
End: 2024-05-08
Attending: OBSTETRICS & GYNECOLOGY
Payer: COMMERCIAL

## 2024-05-15 ENCOUNTER — APPOINTMENT (OUTPATIENT)
Dept: PHYSICAL THERAPY | Age: 34
End: 2024-05-15
Attending: OBSTETRICS & GYNECOLOGY
Payer: COMMERCIAL

## 2024-05-22 ENCOUNTER — APPOINTMENT (OUTPATIENT)
Dept: PHYSICAL THERAPY | Age: 34
End: 2024-05-22
Attending: OBSTETRICS & GYNECOLOGY
Payer: COMMERCIAL

## 2024-05-29 ENCOUNTER — APPOINTMENT (OUTPATIENT)
Dept: PHYSICAL THERAPY | Age: 34
End: 2024-05-29
Attending: OBSTETRICS & GYNECOLOGY
Payer: COMMERCIAL

## 2024-06-05 ENCOUNTER — APPOINTMENT (OUTPATIENT)
Dept: PHYSICAL THERAPY | Age: 34
End: 2024-06-05
Attending: OBSTETRICS & GYNECOLOGY
Payer: COMMERCIAL

## 2024-06-09 ENCOUNTER — PATIENT MESSAGE (OUTPATIENT)
Dept: RHEUMATOLOGY | Facility: CLINIC | Age: 34
End: 2024-06-09

## 2024-06-10 NOTE — TELEPHONE ENCOUNTER
From: Rossy Johnson  To: Mayra Suarez  Sent: 6/9/2024 9:03 PM CDT  Subject: Rash     Hello, I messaged you on May 21st. I’m not sure if my message was missed. I just wanted to let you know that my rash was gone. I wanted to see what other medication we could try since I had an allergic reaction to hydroxychloroquine.

## 2024-06-10 NOTE — TELEPHONE ENCOUNTER
Called patient back she had side effects with hydroxychloroquine.  Sulfasalazine can also cause rashes so hesitant to put her on it.  She is currently breast-feeding and would like to avoid methotrexate and leflunomide  She states her joint pain is not too bad.  If it worsens we could consider putting her on Cimzia.  She will let me know

## 2024-06-12 ENCOUNTER — APPOINTMENT (OUTPATIENT)
Dept: PHYSICAL THERAPY | Age: 34
End: 2024-06-12
Attending: OBSTETRICS & GYNECOLOGY
Payer: COMMERCIAL

## 2024-06-19 ENCOUNTER — APPOINTMENT (OUTPATIENT)
Dept: PHYSICAL THERAPY | Age: 34
End: 2024-06-19
Attending: OBSTETRICS & GYNECOLOGY
Payer: COMMERCIAL

## 2024-07-25 ENCOUNTER — PATIENT MESSAGE (OUTPATIENT)
Dept: RHEUMATOLOGY | Facility: CLINIC | Age: 34
End: 2024-07-25

## 2024-07-26 RX ORDER — NIRMATRELVIR AND RITONAVIR 300-100 MG
KIT ORAL
Qty: 30 TABLET | Refills: 0 | Status: SHIPPED | OUTPATIENT
Start: 2024-07-26 | End: 2024-07-31

## 2024-07-26 NOTE — TELEPHONE ENCOUNTER
From: Rossy Johnson  To: Mayra Khan  Sent: 7/25/2024 8:07 PM CDT  Subject: Covid     Hello, I tested positive for Covid. The urgent care clinic wasn’t able to prescribe me anything. I was wondering if you would be able to send a prescription over for paxlovid? I’m just nervous that I’ll have a flare up because of Covid.

## 2025-03-18 ENCOUNTER — TELEPHONE (OUTPATIENT)
Age: 35
End: 2025-03-18

## 2025-03-18 NOTE — TELEPHONE ENCOUNTER
Received referral via fax. Referral status: Pending Review. Only good for one visit and expires on 03/18/26 (to be determined by insurer). Referral found at the .

## (undated) DEVICE — POWDER HEMSTAT 3GM PURIFIED PLNT STARCH ABSRB

## (undated) NOTE — LETTER
VACCINE ADMINISTRATION RECORD  PARENT / GUARDIAN APPROVAL  Date: 2023  Vaccine administered to: Baudilio Lincoln     : 1990    MRN: [id number]    A copy of the appropriate Centers for Disease Control and Prevention Vaccine Information statement has been provided. I have read or have had explained the information about the diseases and the vaccines listed below. There was an opportunity to ask questions and any questions were answered satisfactorily. I believe that I understand the benefits and risks of the vaccine cited and ask that the vaccine(s) listed below be given to me or to the person named above (for whom I am authorized to make this request). VACCINES ADMINISTERED:  TDAP    I have read and hereby agree to be bound by the terms of this agreement as stated above. My signature is valid until revoked by me in writing. This document is signed by SELF, relationship: SELF on 2023.:                                                                                                                                         Parent / Guardian Signature                                                Date    Esmer Dejesus served as a witness to authentication that the identity of the person signing electronically is in fact the person represented as signing.

## (undated) NOTE — LETTER
[facility]  Administration of Anesthesia  I, Rossy Johnson agree to be cared for by a physician anesthesiologist alone and/or with a nurse anesthetist, who is specially trained to monitor me and give me medicine to put me to sleep or keep me comfortable during my procedure    I understand that my anesthesiologist and/or anesthetist is not an employee or agent of [facility] or [employer]. He or she works for [facility]    As the patient asking for anesthesia services, I agree to:  Allow the anesthesiologist (anesthesia doctor) to give me medicine and do additional procedures as necessary. Some examples are: Starting or using an “IV” to give me medicine, fluids or blood during my procedure, and having a breathing tube placed to help me breathe when I’m asleep (intubation). In the event that my heart stops working properly, I understand that my anesthesiologist will make every effort to sustain my life, unless otherwise directed by [facility] Do Not Resuscitate documents.  Tell my anesthesia doctor before my procedure:  If I am pregnant.  The last time that I ate or drank.  iii. All of the medicines I take (including prescriptions, herbal supplements, and pills I can buy without a prescription (including street drugs/illegal medications). Failure to inform my anesthesiologist about these medicines may increase my risk of anesthetic complications.  iv.If I am allergic to anything or have had a reaction to anesthesia before.  I understand how the anesthesia medicine will help me (benefits).  I understand that with any type of anesthesia medicine there are risks:  The most common risks are: nausea, vomiting, sore throat, muscle soreness, damage to my eyes, mouth, or teeth (from breathing tube placement).  Rare risks include: remembering what happened during my procedure, allergic reactions to medications, injury to my airway, heart, lungs, vision, nerves, or  muscles and in extremely rare instances death.  My doctor has explained to me other choices available to me for my care (alternatives).  Pregnant Patients (“epidural”):  I understand that the risks of having an epidural (medicine given into my back to help control pain during labor), include itching, low blood pressure, difficulty urinating, headache or slowing of the baby’s heart. Very rare risks include infection, bleeding, seizure, irregular heart rhythms and nerve injury.  Regional Anesthesia (“spinal”, “epidural”, & “nerve blocks”):  I understand that rare but potential complications include headache, bleeding, infection, seizure, irregular heart rhythms, and nerve injury.    _____________________________________________________________________________  Patient (or Representative) Signature/Relationship to Patient  Date   Time    _____________________________________________________________________________   Name (if used)    Language/Organization   Time    _____________________________________________________________________________  Nurse Anesthetist Signature     Date   Time  _____________________________________________________________________________  Anesthesiologist Signature     Date   Time  I have discussed the procedure and information above with the patient (or patient’s representative) and answered their questions. The patient or their representative has agreed to have anesthesia services.    _____________________________________________________________________________  Witness        Date   Time  I have verified that the signature is that of the patient or patient’s representative, and that it was signed before the procedure  Patient Name: Rossy Johnson     : 1990                 Printed: 2024 at 12:03 PM    Medical Record #: [id number]                                            Page 1 of 1  ----------ANESTHESIA CONSENT----------